# Patient Record
Sex: MALE | Race: WHITE | Employment: OTHER | URBAN - NONMETROPOLITAN AREA
[De-identification: names, ages, dates, MRNs, and addresses within clinical notes are randomized per-mention and may not be internally consistent; named-entity substitution may affect disease eponyms.]

---

## 2018-01-01 ENCOUNTER — APPOINTMENT (OUTPATIENT)
Dept: GENERAL RADIOLOGY | Age: 83
DRG: 207 | End: 2018-01-01
Attending: FAMILY MEDICINE
Payer: MEDICARE

## 2018-01-01 ENCOUNTER — HOSPITAL ENCOUNTER (INPATIENT)
Age: 83
LOS: 13 days | Discharge: HOSPICE | DRG: 207 | End: 2018-09-25
Attending: FAMILY MEDICINE | Admitting: FAMILY MEDICINE
Payer: MEDICARE

## 2018-01-01 ENCOUNTER — HOSPITAL ENCOUNTER (OUTPATIENT)
Age: 83
End: 2018-09-26
Attending: FAMILY MEDICINE | Admitting: FAMILY MEDICINE
Payer: MEDICARE

## 2018-01-01 ENCOUNTER — APPOINTMENT (OUTPATIENT)
Dept: GENERAL RADIOLOGY | Age: 83
DRG: 885 | End: 2018-01-01
Attending: PSYCHIATRY & NEUROLOGY
Payer: MEDICARE

## 2018-01-01 ENCOUNTER — APPOINTMENT (OUTPATIENT)
Dept: ULTRASOUND IMAGING | Age: 83
DRG: 207 | End: 2018-01-01
Attending: FAMILY MEDICINE
Payer: MEDICARE

## 2018-01-01 ENCOUNTER — HOSPITAL ENCOUNTER (INPATIENT)
Age: 83
LOS: 1 days | Discharge: ANOTHER HOSPITAL | DRG: 885 | End: 2018-09-11
Attending: PSYCHIATRY & NEUROLOGY | Admitting: PSYCHIATRY & NEUROLOGY
Payer: MEDICARE

## 2018-01-01 VITALS
BODY MASS INDEX: 21.79 KG/M2 | OXYGEN SATURATION: 97 % | TEMPERATURE: 99.7 F | RESPIRATION RATE: 16 BRPM | WEIGHT: 130.8 LBS | HEIGHT: 65 IN | DIASTOLIC BLOOD PRESSURE: 63 MMHG | SYSTOLIC BLOOD PRESSURE: 138 MMHG | HEART RATE: 68 BPM

## 2018-01-01 VITALS
DIASTOLIC BLOOD PRESSURE: 41 MMHG | WEIGHT: 145.6 LBS | HEART RATE: 104 BPM | HEIGHT: 66 IN | TEMPERATURE: 98.5 F | RESPIRATION RATE: 25 BRPM | SYSTOLIC BLOOD PRESSURE: 95 MMHG | BODY MASS INDEX: 23.4 KG/M2 | OXYGEN SATURATION: 97 %

## 2018-01-01 VITALS — RESPIRATION RATE: 26 BRPM

## 2018-01-01 LAB
ALBUMIN SERPL-MCNC: 1.5 G/DL (ref 3.5–5.2)
ALBUMIN SERPL-MCNC: 1.8 G/DL (ref 3.5–5.2)
ALBUMIN SERPL-MCNC: 2.1 G/DL (ref 3.5–5.2)
ALBUMIN SERPL-MCNC: 2.3 G/DL (ref 3.5–5.2)
ALBUMIN SERPL-MCNC: 2.5 G/DL (ref 3.5–5.2)
ALBUMIN SERPL-MCNC: 2.5 G/DL (ref 3.5–5.2)
ALBUMIN SERPL-MCNC: 2.9 G/DL (ref 3.5–5.2)
ALBUMIN SERPL-MCNC: 4.5 G/DL (ref 3.5–5.2)
ALP BLD-CCNC: 100 U/L (ref 40–130)
ALP BLD-CCNC: 131 U/L (ref 40–130)
ALP BLD-CCNC: 279 U/L (ref 40–130)
ALP BLD-CCNC: 43 U/L (ref 40–130)
ALP BLD-CCNC: 45 U/L (ref 40–130)
ALP BLD-CCNC: 59 U/L (ref 40–130)
ALP BLD-CCNC: 68 U/L (ref 40–130)
ALP BLD-CCNC: 72 U/L (ref 40–130)
ALT SERPL-CCNC: 19 U/L (ref 5–41)
ALT SERPL-CCNC: 25 U/L (ref 5–41)
ALT SERPL-CCNC: 27 U/L (ref 5–41)
ALT SERPL-CCNC: 27 U/L (ref 5–41)
ALT SERPL-CCNC: 28 U/L (ref 5–41)
ALT SERPL-CCNC: 29 U/L (ref 5–41)
ALT SERPL-CCNC: 35 U/L (ref 5–41)
ALT SERPL-CCNC: 92 U/L (ref 5–41)
AMORPHOUS: ABNORMAL /HPF
ANION GAP SERPL CALCULATED.3IONS-SCNC: 10 MMOL/L (ref 7–19)
ANION GAP SERPL CALCULATED.3IONS-SCNC: 11 MMOL/L (ref 7–19)
ANION GAP SERPL CALCULATED.3IONS-SCNC: 12 MMOL/L (ref 7–19)
ANION GAP SERPL CALCULATED.3IONS-SCNC: 14 MMOL/L (ref 7–19)
ANION GAP SERPL CALCULATED.3IONS-SCNC: 15 MMOL/L (ref 7–19)
ANION GAP SERPL CALCULATED.3IONS-SCNC: 15 MMOL/L (ref 7–19)
ANION GAP SERPL CALCULATED.3IONS-SCNC: 16 MMOL/L (ref 7–19)
ANION GAP SERPL CALCULATED.3IONS-SCNC: 16 MMOL/L (ref 7–19)
ANION GAP SERPL CALCULATED.3IONS-SCNC: 17 MMOL/L (ref 7–19)
ANION GAP SERPL CALCULATED.3IONS-SCNC: 22 MMOL/L (ref 7–19)
ANION GAP SERPL CALCULATED.3IONS-SCNC: 5 MMOL/L (ref 7–19)
ANION GAP SERPL CALCULATED.3IONS-SCNC: 7 MMOL/L (ref 7–19)
ANION GAP SERPL CALCULATED.3IONS-SCNC: 8 MMOL/L (ref 7–19)
ANION GAP SERPL CALCULATED.3IONS-SCNC: 8 MMOL/L (ref 7–19)
ANION GAP SERPL CALCULATED.3IONS-SCNC: 9 MMOL/L (ref 7–19)
AST SERPL-CCNC: 256 U/L (ref 5–40)
AST SERPL-CCNC: 27 U/L (ref 5–40)
AST SERPL-CCNC: 29 U/L (ref 5–40)
AST SERPL-CCNC: 30 U/L (ref 5–40)
AST SERPL-CCNC: 32 U/L (ref 5–40)
AST SERPL-CCNC: 32 U/L (ref 5–40)
AST SERPL-CCNC: 38 U/L (ref 5–40)
AST SERPL-CCNC: 57 U/L (ref 5–40)
ATYPICAL LYMPHOCYTE RELATIVE PERCENT: 0 % (ref 0–8)
ATYPICAL LYMPHOCYTE RELATIVE PERCENT: 1 % (ref 0–8)
ATYPICAL LYMPHOCYTE RELATIVE PERCENT: 3 % (ref 0–8)
BANDED NEUTROPHILS RELATIVE PERCENT: 1 % (ref 0–5)
BANDED NEUTROPHILS RELATIVE PERCENT: 11 % (ref 0–5)
BANDED NEUTROPHILS RELATIVE PERCENT: 11 % (ref 0–5)
BANDED NEUTROPHILS RELATIVE PERCENT: 18 % (ref 0–5)
BANDED NEUTROPHILS RELATIVE PERCENT: 5 % (ref 0–5)
BANDED NEUTROPHILS RELATIVE PERCENT: 5 % (ref 0–5)
BANDED NEUTROPHILS RELATIVE PERCENT: 8 % (ref 0–5)
BANDED NEUTROPHILS RELATIVE PERCENT: 8 % (ref 0–5)
BASE EXCESS ARTERIAL: -3.5 MMOL/L (ref -2–2)
BASE EXCESS ARTERIAL: 0.7 MMOL/L (ref -2–2)
BASE EXCESS ARTERIAL: 1.1 MMOL/L (ref -2–2)
BASE EXCESS ARTERIAL: 1.1 MMOL/L (ref -2–2)
BASE EXCESS ARTERIAL: 1.4 MMOL/L (ref -2–2)
BASE EXCESS ARTERIAL: 2.9 MMOL/L (ref -2–2)
BASE EXCESS ARTERIAL: 3 MMOL/L (ref -2–2)
BASE EXCESS ARTERIAL: 3.8 MMOL/L (ref -2–2)
BASE EXCESS ARTERIAL: 4.3 MMOL/L (ref -2–2)
BASE EXCESS ARTERIAL: 4.9 MMOL/L (ref -2–2)
BASE EXCESS ARTERIAL: 7.1 MMOL/L (ref -2–2)
BASE EXCESS ARTERIAL: 9.1 MMOL/L (ref -2–2)
BASOPHILS ABSOLUTE: 0 K/UL (ref 0–0.2)
BASOPHILS ABSOLUTE: 0.1 K/UL (ref 0–0.2)
BASOPHILS MANUAL: 0 %
BASOPHILS RELATIVE PERCENT: 0 % (ref 0–1)
BASOPHILS RELATIVE PERCENT: 0.1 % (ref 0–1)
BASOPHILS RELATIVE PERCENT: 0.2 % (ref 0–1)
BASOPHILS RELATIVE PERCENT: 0.3 % (ref 0–1)
BASOPHILS RELATIVE PERCENT: 1 % (ref 0–1)
BILIRUB SERPL-MCNC: 0.7 MG/DL (ref 0.2–1.2)
BILIRUB SERPL-MCNC: 0.8 MG/DL (ref 0.2–1.2)
BILIRUB SERPL-MCNC: 1.4 MG/DL (ref 0.2–1.2)
BILIRUB SERPL-MCNC: 1.5 MG/DL (ref 0.2–1.2)
BILIRUB SERPL-MCNC: 1.6 MG/DL (ref 0.2–1.2)
BILIRUB SERPL-MCNC: 1.9 MG/DL (ref 0.2–1.2)
BILIRUB SERPL-MCNC: 1.9 MG/DL (ref 0.2–1.2)
BILIRUB SERPL-MCNC: 2.1 MG/DL (ref 0.2–1.2)
BILIRUBIN URINE: ABNORMAL
BLOOD CULTURE, ROUTINE: NORMAL
BLOOD, URINE: ABNORMAL
BUN BLDV-MCNC: 19 MG/DL (ref 8–23)
BUN BLDV-MCNC: 21 MG/DL (ref 8–23)
BUN BLDV-MCNC: 30 MG/DL (ref 8–23)
BUN BLDV-MCNC: 52 MG/DL (ref 8–23)
BUN BLDV-MCNC: 75 MG/DL (ref 8–23)
BUN BLDV-MCNC: 75 MG/DL (ref 8–23)
BUN BLDV-MCNC: 76 MG/DL (ref 8–23)
BUN BLDV-MCNC: 78 MG/DL (ref 8–23)
BUN BLDV-MCNC: 80 MG/DL (ref 8–23)
BUN BLDV-MCNC: 82 MG/DL (ref 8–23)
BUN BLDV-MCNC: 84 MG/DL (ref 8–23)
BUN BLDV-MCNC: 84 MG/DL (ref 8–23)
BUN BLDV-MCNC: 85 MG/DL (ref 8–23)
BUN BLDV-MCNC: 87 MG/DL (ref 8–23)
BUN BLDV-MCNC: 90 MG/DL (ref 8–23)
BUN BLDV-MCNC: 96 MG/DL (ref 8–23)
BUN BLDV-MCNC: 99 MG/DL (ref 8–23)
C DIFFICILE TOXIN, EIA: NORMAL
CALCIUM SERPL-MCNC: 7.6 MG/DL (ref 8.8–10.2)
CALCIUM SERPL-MCNC: 7.7 MG/DL (ref 8.8–10.2)
CALCIUM SERPL-MCNC: 7.8 MG/DL (ref 8.8–10.2)
CALCIUM SERPL-MCNC: 8 MG/DL (ref 8.8–10.2)
CALCIUM SERPL-MCNC: 8.1 MG/DL (ref 8.8–10.2)
CALCIUM SERPL-MCNC: 8.3 MG/DL (ref 8.8–10.2)
CALCIUM SERPL-MCNC: 8.4 MG/DL (ref 8.8–10.2)
CALCIUM SERPL-MCNC: 8.6 MG/DL (ref 8.8–10.2)
CALCIUM SERPL-MCNC: 8.6 MG/DL (ref 8.8–10.2)
CALCIUM SERPL-MCNC: 8.7 MG/DL (ref 8.8–10.2)
CALCIUM SERPL-MCNC: 8.8 MG/DL (ref 8.8–10.2)
CALCIUM SERPL-MCNC: 8.8 MG/DL (ref 8.8–10.2)
CALCIUM SERPL-MCNC: 9 MG/DL (ref 8.8–10.2)
CALCIUM SERPL-MCNC: 9.1 MG/DL (ref 8.8–10.2)
CALCIUM SERPL-MCNC: 9.8 MG/DL (ref 8.8–10.2)
CARBOXYHEMOGLOBIN ARTERIAL: 1.8 % (ref 0–5)
CARBOXYHEMOGLOBIN ARTERIAL: 1.9 % (ref 0–5)
CARBOXYHEMOGLOBIN ARTERIAL: 1.9 % (ref 0–5)
CARBOXYHEMOGLOBIN ARTERIAL: 2.1 % (ref 0–5)
CARBOXYHEMOGLOBIN ARTERIAL: 2.1 % (ref 0–5)
CARBOXYHEMOGLOBIN ARTERIAL: 2.2 % (ref 0–5)
CARBOXYHEMOGLOBIN ARTERIAL: 2.2 % (ref 0–5)
CARBOXYHEMOGLOBIN ARTERIAL: 2.4 % (ref 0–5)
CARBOXYHEMOGLOBIN ARTERIAL: 2.7 % (ref 0–5)
CARBOXYHEMOGLOBIN ARTERIAL: 2.7 % (ref 0–5)
CHLORIDE BLD-SCNC: 101 MMOL/L (ref 98–111)
CHLORIDE BLD-SCNC: 102 MMOL/L (ref 98–111)
CHLORIDE BLD-SCNC: 103 MMOL/L (ref 98–111)
CHLORIDE BLD-SCNC: 104 MMOL/L (ref 98–111)
CHLORIDE BLD-SCNC: 106 MMOL/L (ref 98–111)
CHLORIDE BLD-SCNC: 109 MMOL/L (ref 98–111)
CHLORIDE BLD-SCNC: 109 MMOL/L (ref 98–111)
CHLORIDE BLD-SCNC: 112 MMOL/L (ref 98–111)
CHLORIDE BLD-SCNC: 97 MMOL/L (ref 98–111)
CHLORIDE BLD-SCNC: 98 MMOL/L (ref 98–111)
CLARITY: ABNORMAL
CO2: 21 MMOL/L (ref 22–29)
CO2: 22 MMOL/L (ref 22–29)
CO2: 23 MMOL/L (ref 22–29)
CO2: 23 MMOL/L (ref 22–29)
CO2: 24 MMOL/L (ref 22–29)
CO2: 24 MMOL/L (ref 22–29)
CO2: 25 MMOL/L (ref 22–29)
CO2: 26 MMOL/L (ref 22–29)
CO2: 27 MMOL/L (ref 22–29)
CO2: 27 MMOL/L (ref 22–29)
CO2: 28 MMOL/L (ref 22–29)
CO2: 28 MMOL/L (ref 22–29)
CO2: 29 MMOL/L (ref 22–29)
CO2: 29 MMOL/L (ref 22–29)
CO2: 33 MMOL/L (ref 22–29)
COLOR: YELLOW
CREAT SERPL-MCNC: 0.7 MG/DL (ref 0.5–1.2)
CREAT SERPL-MCNC: 0.8 MG/DL (ref 0.5–1.2)
CREAT SERPL-MCNC: 1 MG/DL (ref 0.5–1.2)
CREAT SERPL-MCNC: 1.1 MG/DL (ref 0.5–1.2)
CREAT SERPL-MCNC: 1.2 MG/DL (ref 0.5–1.2)
CREAT SERPL-MCNC: 1.3 MG/DL (ref 0.5–1.2)
CREAT SERPL-MCNC: 1.3 MG/DL (ref 0.5–1.2)
CREAT SERPL-MCNC: 1.4 MG/DL (ref 0.5–1.2)
CREAT SERPL-MCNC: 1.4 MG/DL (ref 0.5–1.2)
CREAT SERPL-MCNC: 1.5 MG/DL (ref 0.5–1.2)
CREAT SERPL-MCNC: 1.7 MG/DL (ref 0.5–1.2)
CREAT SERPL-MCNC: 1.7 MG/DL (ref 0.5–1.2)
CREAT SERPL-MCNC: 2 MG/DL (ref 0.5–1.2)
CREAT SERPL-MCNC: 2 MG/DL (ref 0.5–1.2)
CREAT SERPL-MCNC: 3.1 MG/DL (ref 0.5–1.2)
CREATININE URINE: 146.3 MG/DL (ref 4.2–622)
CULTURE, BLOOD 2: NORMAL
CULTURE, RESPIRATORY: ABNORMAL
EKG P AXIS: 78 DEGREES
EKG P AXIS: 78 DEGREES
EKG P AXIS: 95 DEGREES
EKG P AXIS: NORMAL DEGREES
EKG P AXIS: NORMAL DEGREES
EKG P-R INTERVAL: 150 MS
EKG P-R INTERVAL: 162 MS
EKG P-R INTERVAL: 162 MS
EKG P-R INTERVAL: NORMAL MS
EKG P-R INTERVAL: NORMAL MS
EKG Q-T INTERVAL: 294 MS
EKG Q-T INTERVAL: 330 MS
EKG Q-T INTERVAL: 410 MS
EKG Q-T INTERVAL: 418 MS
EKG Q-T INTERVAL: 446 MS
EKG QRS DURATION: 100 MS
EKG QRS DURATION: 106 MS
EKG QRS DURATION: 88 MS
EKG QRS DURATION: 94 MS
EKG QRS DURATION: 98 MS
EKG QTC CALCULATION (BAZETT): 432 MS
EKG QTC CALCULATION (BAZETT): 436 MS
EKG QTC CALCULATION (BAZETT): 447 MS
EKG QTC CALCULATION (BAZETT): 457 MS
EKG QTC CALCULATION (BAZETT): 460 MS
EKG T AXIS: -66 DEGREES
EKG T AXIS: -85 DEGREES
EKG T AXIS: 25 DEGREES
EKG T AXIS: 3 DEGREES
EKG T AXIS: 53 DEGREES
EOSINOPHIL,URINE: NORMAL
EOSINOPHILS ABSOLUTE: 0 K/UL (ref 0–0.6)
EOSINOPHILS ABSOLUTE: 0.2 K/UL (ref 0–0.6)
EOSINOPHILS ABSOLUTE: 0.3 K/UL (ref 0–0.6)
EOSINOPHILS ABSOLUTE: 0.6 K/UL (ref 0–0.6)
EOSINOPHILS ABSOLUTE: 0.8 K/UL (ref 0–0.6)
EOSINOPHILS RELATIVE PERCENT: 0 % (ref 0–5)
EOSINOPHILS RELATIVE PERCENT: 0.1 % (ref 0–5)
EOSINOPHILS RELATIVE PERCENT: 0.6 % (ref 0–5)
EOSINOPHILS RELATIVE PERCENT: 0.9 % (ref 0–5)
EOSINOPHILS RELATIVE PERCENT: 2 % (ref 0–5)
EOSINOPHILS RELATIVE PERCENT: 5 % (ref 0–5)
EPITHELIAL CELLS, UA: ABNORMAL /HPF
GFR NON-AFRICAN AMERICAN: 19
GFR NON-AFRICAN AMERICAN: 32
GFR NON-AFRICAN AMERICAN: 32
GFR NON-AFRICAN AMERICAN: 38
GFR NON-AFRICAN AMERICAN: 38
GFR NON-AFRICAN AMERICAN: 44
GFR NON-AFRICAN AMERICAN: 48
GFR NON-AFRICAN AMERICAN: 48
GFR NON-AFRICAN AMERICAN: 52
GFR NON-AFRICAN AMERICAN: 52
GFR NON-AFRICAN AMERICAN: 57
GFR NON-AFRICAN AMERICAN: >60
GLUCOSE BLD-MCNC: 104 MG/DL (ref 70–99)
GLUCOSE BLD-MCNC: 112 MG/DL (ref 70–99)
GLUCOSE BLD-MCNC: 112 MG/DL (ref 74–109)
GLUCOSE BLD-MCNC: 116 MG/DL (ref 74–109)
GLUCOSE BLD-MCNC: 119 MG/DL (ref 74–109)
GLUCOSE BLD-MCNC: 121 MG/DL (ref 70–99)
GLUCOSE BLD-MCNC: 124 MG/DL (ref 70–99)
GLUCOSE BLD-MCNC: 126 MG/DL (ref 70–99)
GLUCOSE BLD-MCNC: 128 MG/DL (ref 74–109)
GLUCOSE BLD-MCNC: 129 MG/DL (ref 70–99)
GLUCOSE BLD-MCNC: 130 MG/DL (ref 70–99)
GLUCOSE BLD-MCNC: 132 MG/DL (ref 70–99)
GLUCOSE BLD-MCNC: 135 MG/DL (ref 70–99)
GLUCOSE BLD-MCNC: 139 MG/DL (ref 70–99)
GLUCOSE BLD-MCNC: 146 MG/DL (ref 70–99)
GLUCOSE BLD-MCNC: 147 MG/DL (ref 70–99)
GLUCOSE BLD-MCNC: 147 MG/DL (ref 74–109)
GLUCOSE BLD-MCNC: 149 MG/DL (ref 70–99)
GLUCOSE BLD-MCNC: 150 MG/DL (ref 70–99)
GLUCOSE BLD-MCNC: 154 MG/DL (ref 70–99)
GLUCOSE BLD-MCNC: 156 MG/DL (ref 70–99)
GLUCOSE BLD-MCNC: 156 MG/DL (ref 70–99)
GLUCOSE BLD-MCNC: 158 MG/DL (ref 70–99)
GLUCOSE BLD-MCNC: 160 MG/DL (ref 70–99)
GLUCOSE BLD-MCNC: 161 MG/DL (ref 70–99)
GLUCOSE BLD-MCNC: 161 MG/DL (ref 74–109)
GLUCOSE BLD-MCNC: 161 MG/DL (ref 74–109)
GLUCOSE BLD-MCNC: 164 MG/DL (ref 70–99)
GLUCOSE BLD-MCNC: 165 MG/DL (ref 70–99)
GLUCOSE BLD-MCNC: 166 MG/DL (ref 70–99)
GLUCOSE BLD-MCNC: 167 MG/DL (ref 70–99)
GLUCOSE BLD-MCNC: 168 MG/DL (ref 70–99)
GLUCOSE BLD-MCNC: 168 MG/DL (ref 74–109)
GLUCOSE BLD-MCNC: 169 MG/DL (ref 74–109)
GLUCOSE BLD-MCNC: 171 MG/DL (ref 70–99)
GLUCOSE BLD-MCNC: 176 MG/DL (ref 70–99)
GLUCOSE BLD-MCNC: 176 MG/DL (ref 74–109)
GLUCOSE BLD-MCNC: 181 MG/DL (ref 74–109)
GLUCOSE BLD-MCNC: 184 MG/DL (ref 70–99)
GLUCOSE BLD-MCNC: 187 MG/DL (ref 70–99)
GLUCOSE BLD-MCNC: 188 MG/DL (ref 70–99)
GLUCOSE BLD-MCNC: 189 MG/DL (ref 74–109)
GLUCOSE BLD-MCNC: 190 MG/DL (ref 70–99)
GLUCOSE BLD-MCNC: 191 MG/DL (ref 70–99)
GLUCOSE BLD-MCNC: 200 MG/DL (ref 70–99)
GLUCOSE BLD-MCNC: 201 MG/DL (ref 70–99)
GLUCOSE BLD-MCNC: 207 MG/DL (ref 70–99)
GLUCOSE BLD-MCNC: 209 MG/DL (ref 70–99)
GLUCOSE BLD-MCNC: 209 MG/DL (ref 70–99)
GLUCOSE BLD-MCNC: 212 MG/DL (ref 70–99)
GLUCOSE BLD-MCNC: 217 MG/DL (ref 70–99)
GLUCOSE BLD-MCNC: 220 MG/DL (ref 70–99)
GLUCOSE BLD-MCNC: 222 MG/DL (ref 70–99)
GLUCOSE BLD-MCNC: 234 MG/DL (ref 74–109)
GLUCOSE BLD-MCNC: 239 MG/DL (ref 70–99)
GLUCOSE BLD-MCNC: 241 MG/DL (ref 70–99)
GLUCOSE BLD-MCNC: 244 MG/DL (ref 70–99)
GLUCOSE BLD-MCNC: 255 MG/DL (ref 70–99)
GLUCOSE BLD-MCNC: 257 MG/DL (ref 70–99)
GLUCOSE BLD-MCNC: 262 MG/DL (ref 70–99)
GLUCOSE BLD-MCNC: 263 MG/DL (ref 70–99)
GLUCOSE BLD-MCNC: 266 MG/DL (ref 74–109)
GLUCOSE BLD-MCNC: 275 MG/DL (ref 70–99)
GLUCOSE BLD-MCNC: 278 MG/DL (ref 70–99)
GLUCOSE BLD-MCNC: 294 MG/DL (ref 70–99)
GLUCOSE BLD-MCNC: 296 MG/DL (ref 70–99)
GLUCOSE BLD-MCNC: 304 MG/DL (ref 70–99)
GLUCOSE BLD-MCNC: 304 MG/DL (ref 74–109)
GLUCOSE BLD-MCNC: 315 MG/DL (ref 70–99)
GLUCOSE BLD-MCNC: 318 MG/DL (ref 74–109)
GLUCOSE BLD-MCNC: 322 MG/DL (ref 74–109)
GLUCOSE BLD-MCNC: 328 MG/DL (ref 70–99)
GLUCOSE BLD-MCNC: 424 MG/DL (ref 70–99)
GLUCOSE BLD-MCNC: 87 MG/DL (ref 70–99)
GLUCOSE BLD-MCNC: 94 MG/DL (ref 70–99)
GLUCOSE URINE: NEGATIVE MG/DL
GRAM STAIN RESULT: ABNORMAL
HBA1C MFR BLD: 6.1 % (ref 4–6)
HCO3 ARTERIAL: 20.4 MMOL/L (ref 22–26)
HCO3 ARTERIAL: 23.6 MMOL/L (ref 22–26)
HCO3 ARTERIAL: 24.5 MMOL/L (ref 22–26)
HCO3 ARTERIAL: 25 MMOL/L (ref 22–26)
HCO3 ARTERIAL: 25.1 MMOL/L (ref 22–26)
HCO3 ARTERIAL: 25.7 MMOL/L (ref 22–26)
HCO3 ARTERIAL: 26.3 MMOL/L (ref 22–26)
HCO3 ARTERIAL: 27.9 MMOL/L (ref 22–26)
HCO3 ARTERIAL: 29.2 MMOL/L (ref 22–26)
HCO3 ARTERIAL: 29.9 MMOL/L (ref 22–26)
HCO3 ARTERIAL: 31.3 MMOL/L (ref 22–26)
HCO3 ARTERIAL: 32.7 MMOL/L (ref 22–26)
HCT VFR BLD CALC: 29.2 % (ref 42–52)
HCT VFR BLD CALC: 29.8 % (ref 42–52)
HCT VFR BLD CALC: 32.6 % (ref 42–52)
HCT VFR BLD CALC: 34.3 % (ref 42–52)
HCT VFR BLD CALC: 37.8 % (ref 42–52)
HCT VFR BLD CALC: 38 % (ref 42–52)
HCT VFR BLD CALC: 43.9 % (ref 42–52)
HCT VFR BLD CALC: 44.2 % (ref 42–52)
HCT VFR BLD CALC: 44.6 % (ref 42–52)
HCT VFR BLD CALC: 46.2 % (ref 42–52)
HCT VFR BLD CALC: 46.4 % (ref 42–52)
HCT VFR BLD CALC: 47.5 % (ref 42–52)
HCT VFR BLD CALC: 48.5 % (ref 42–52)
HCT VFR BLD CALC: 49.4 % (ref 42–52)
HCT VFR BLD CALC: 51.2 % (ref 42–52)
HEMOGLOBIN, ART, EXTENDED: 10.5 G/DL (ref 14–18)
HEMOGLOBIN, ART, EXTENDED: 10.8 G/DL (ref 14–18)
HEMOGLOBIN, ART, EXTENDED: 11.5 G/DL (ref 14–18)
HEMOGLOBIN, ART, EXTENDED: 12.4 G/DL (ref 14–18)
HEMOGLOBIN, ART, EXTENDED: 13.1 G/DL (ref 14–18)
HEMOGLOBIN, ART, EXTENDED: 13.4 G/DL (ref 14–18)
HEMOGLOBIN, ART, EXTENDED: 13.5 G/DL (ref 14–18)
HEMOGLOBIN, ART, EXTENDED: 14.1 G/DL (ref 14–18)
HEMOGLOBIN, ART, EXTENDED: 15 G/DL (ref 14–18)
HEMOGLOBIN, ART, EXTENDED: 15.3 G/DL (ref 14–18)
HEMOGLOBIN, ART, EXTENDED: 16.3 G/DL (ref 14–18)
HEMOGLOBIN, ART, EXTENDED: 16.7 G/DL (ref 14–18)
HEMOGLOBIN: 10 G/DL (ref 14–18)
HEMOGLOBIN: 10.9 G/DL (ref 14–18)
HEMOGLOBIN: 11.5 G/DL (ref 14–18)
HEMOGLOBIN: 12.5 G/DL (ref 14–18)
HEMOGLOBIN: 12.7 G/DL (ref 14–18)
HEMOGLOBIN: 14.5 G/DL (ref 14–18)
HEMOGLOBIN: 14.7 G/DL (ref 14–18)
HEMOGLOBIN: 15.1 G/DL (ref 14–18)
HEMOGLOBIN: 15.5 G/DL (ref 14–18)
HEMOGLOBIN: 15.8 G/DL (ref 14–18)
HEMOGLOBIN: 15.9 G/DL (ref 14–18)
HEMOGLOBIN: 16.1 G/DL (ref 14–18)
HEMOGLOBIN: 16.2 G/DL (ref 14–18)
HEMOGLOBIN: 16.9 G/DL (ref 14–18)
HEMOGLOBIN: 9.6 G/DL (ref 14–18)
HYPOCHROMIA: ABNORMAL
KETONES, URINE: ABNORMAL MG/DL
LACTIC ACID: 1.6 MMOL/L (ref 0.5–1.9)
LEUKOCYTE ESTERASE, URINE: NEGATIVE
LV EF: 50 %
LVEF MODALITY: NORMAL
LYMPHOCYTES ABSOLUTE: 0.8 K/UL (ref 1.1–4.5)
LYMPHOCYTES ABSOLUTE: 1 K/UL (ref 1.1–4.5)
LYMPHOCYTES ABSOLUTE: 1.4 K/UL (ref 1.1–4.5)
LYMPHOCYTES ABSOLUTE: 1.4 K/UL (ref 1.1–4.5)
LYMPHOCYTES ABSOLUTE: 1.5 K/UL (ref 1.1–4.5)
LYMPHOCYTES ABSOLUTE: 1.6 K/UL (ref 1.1–4.5)
LYMPHOCYTES ABSOLUTE: 1.6 K/UL (ref 1.1–4.5)
LYMPHOCYTES ABSOLUTE: 1.9 K/UL (ref 1.1–4.5)
LYMPHOCYTES ABSOLUTE: 2 K/UL (ref 1.1–4.5)
LYMPHOCYTES ABSOLUTE: 2 K/UL (ref 1.1–4.5)
LYMPHOCYTES ABSOLUTE: 2.1 K/UL (ref 1.1–4.5)
LYMPHOCYTES ABSOLUTE: 2.8 K/UL (ref 1.1–4.5)
LYMPHOCYTES ABSOLUTE: 2.8 K/UL (ref 1.1–4.5)
LYMPHOCYTES RELATIVE PERCENT: 14 % (ref 20–40)
LYMPHOCYTES RELATIVE PERCENT: 15 % (ref 20–40)
LYMPHOCYTES RELATIVE PERCENT: 17 % (ref 20–40)
LYMPHOCYTES RELATIVE PERCENT: 17 % (ref 20–40)
LYMPHOCYTES RELATIVE PERCENT: 23.5 % (ref 20–40)
LYMPHOCYTES RELATIVE PERCENT: 3.5 % (ref 20–40)
LYMPHOCYTES RELATIVE PERCENT: 3.9 % (ref 20–40)
LYMPHOCYTES RELATIVE PERCENT: 4 % (ref 20–40)
LYMPHOCYTES RELATIVE PERCENT: 4.4 % (ref 20–40)
LYMPHOCYTES RELATIVE PERCENT: 5 % (ref 20–40)
LYMPHOCYTES RELATIVE PERCENT: 5.1 % (ref 20–40)
LYMPHOCYTES RELATIVE PERCENT: 7 % (ref 20–40)
LYMPHOCYTES RELATIVE PERCENT: 7 % (ref 20–40)
LYMPHOCYTES RELATIVE PERCENT: 8.7 % (ref 20–40)
LYMPHOCYTES RELATIVE PERCENT: 9 % (ref 20–40)
MACROCYTES: ABNORMAL
MAGNESIUM: 2.4 MG/DL (ref 1.6–2.4)
MAGNESIUM: 2.7 MG/DL (ref 1.6–2.4)
MCH RBC QN AUTO: 32.6 PG (ref 27–31)
MCH RBC QN AUTO: 32.9 PG (ref 27–31)
MCH RBC QN AUTO: 32.9 PG (ref 27–31)
MCH RBC QN AUTO: 33 PG (ref 27–31)
MCH RBC QN AUTO: 33.1 PG (ref 27–31)
MCH RBC QN AUTO: 33.1 PG (ref 27–31)
MCH RBC QN AUTO: 33.2 PG (ref 27–31)
MCH RBC QN AUTO: 33.3 PG (ref 27–31)
MCH RBC QN AUTO: 33.4 PG (ref 27–31)
MCH RBC QN AUTO: 33.5 PG (ref 27–31)
MCHC RBC AUTO-ENTMCNC: 32.5 G/DL (ref 33–37)
MCHC RBC AUTO-ENTMCNC: 32.6 G/DL (ref 33–37)
MCHC RBC AUTO-ENTMCNC: 32.9 G/DL (ref 33–37)
MCHC RBC AUTO-ENTMCNC: 33 G/DL (ref 33–37)
MCHC RBC AUTO-ENTMCNC: 33.1 G/DL (ref 33–37)
MCHC RBC AUTO-ENTMCNC: 33.3 G/DL (ref 33–37)
MCHC RBC AUTO-ENTMCNC: 33.4 G/DL (ref 33–37)
MCHC RBC AUTO-ENTMCNC: 33.5 G/DL (ref 33–37)
MCHC RBC AUTO-ENTMCNC: 33.5 G/DL (ref 33–37)
MCHC RBC AUTO-ENTMCNC: 33.6 G/DL (ref 33–37)
MCHC RBC AUTO-ENTMCNC: 34.2 G/DL (ref 33–37)
MCHC RBC AUTO-ENTMCNC: 34.4 G/DL (ref 33–37)
MCV RBC AUTO: 100 FL (ref 80–94)
MCV RBC AUTO: 100 FL (ref 80–94)
MCV RBC AUTO: 100.3 FL (ref 80–94)
MCV RBC AUTO: 101.1 FL (ref 80–94)
MCV RBC AUTO: 101.8 FL (ref 80–94)
MCV RBC AUTO: 96.9 FL (ref 80–94)
MCV RBC AUTO: 97.3 FL (ref 80–94)
MCV RBC AUTO: 98.5 FL (ref 80–94)
MCV RBC AUTO: 98.8 FL (ref 80–94)
MCV RBC AUTO: 99.3 FL (ref 80–94)
MCV RBC AUTO: 99.6 FL (ref 80–94)
MCV RBC AUTO: 99.7 FL (ref 80–94)
MCV RBC AUTO: 99.8 FL (ref 80–94)
METHEMOGLOBIN ARTERIAL: 1.1 %
METHEMOGLOBIN ARTERIAL: 1.2 %
METHEMOGLOBIN ARTERIAL: 1.4 %
METHEMOGLOBIN ARTERIAL: 1.5 %
METHEMOGLOBIN ARTERIAL: 1.6 %
METHEMOGLOBIN ARTERIAL: 1.6 %
METHEMOGLOBIN ARTERIAL: 1.7 %
METHEMOGLOBIN ARTERIAL: 1.9 %
MONOCYTES ABSOLUTE: 0 K/UL (ref 0–0.9)
MONOCYTES ABSOLUTE: 0.6 K/UL (ref 0–0.9)
MONOCYTES ABSOLUTE: 0.8 K/UL (ref 0–0.9)
MONOCYTES ABSOLUTE: 0.9 K/UL (ref 0–0.9)
MONOCYTES ABSOLUTE: 1 K/UL (ref 0–0.9)
MONOCYTES ABSOLUTE: 1.1 K/UL (ref 0–0.9)
MONOCYTES ABSOLUTE: 1.2 K/UL (ref 0–0.9)
MONOCYTES ABSOLUTE: 1.3 K/UL (ref 0–0.9)
MONOCYTES ABSOLUTE: 1.4 K/UL (ref 0–0.9)
MONOCYTES ABSOLUTE: 1.5 K/UL (ref 0–0.9)
MONOCYTES ABSOLUTE: 1.6 K/UL (ref 0–0.9)
MONOCYTES ABSOLUTE: 1.8 K/UL (ref 0–0.9)
MONOCYTES ABSOLUTE: 1.8 K/UL (ref 0–0.9)
MONOCYTES ABSOLUTE: 2 K/UL (ref 0–0.9)
MONOCYTES ABSOLUTE: 2.1 K/UL (ref 0–0.9)
MONOCYTES RELATIVE PERCENT: 0 % (ref 0–10)
MONOCYTES RELATIVE PERCENT: 10 % (ref 0–10)
MONOCYTES RELATIVE PERCENT: 10.1 % (ref 0–10)
MONOCYTES RELATIVE PERCENT: 4 % (ref 0–10)
MONOCYTES RELATIVE PERCENT: 5 % (ref 0–10)
MONOCYTES RELATIVE PERCENT: 5.8 % (ref 0–10)
MONOCYTES RELATIVE PERCENT: 6.2 % (ref 0–10)
MONOCYTES RELATIVE PERCENT: 6.4 % (ref 0–10)
MONOCYTES RELATIVE PERCENT: 6.7 % (ref 0–10)
MONOCYTES RELATIVE PERCENT: 8 % (ref 0–10)
MONOCYTES RELATIVE PERCENT: 9 % (ref 0–10)
MONOCYTES RELATIVE PERCENT: 9 % (ref 0–10)
MYELOCYTE PERCENT: 1 %
NEUTROPHILS ABSOLUTE: 11.1 K/UL (ref 1.5–7.5)
NEUTROPHILS ABSOLUTE: 13 K/UL (ref 1.5–7.5)
NEUTROPHILS ABSOLUTE: 18.1 K/UL (ref 1.5–7.5)
NEUTROPHILS ABSOLUTE: 18.9 K/UL (ref 1.5–7.5)
NEUTROPHILS ABSOLUTE: 20.3 K/UL (ref 1.5–7.5)
NEUTROPHILS ABSOLUTE: 25.2 K/UL (ref 1.5–7.5)
NEUTROPHILS ABSOLUTE: 27.1 K/UL (ref 1.5–7.5)
NEUTROPHILS ABSOLUTE: 34.4 K/UL (ref 1.5–7.5)
NEUTROPHILS ABSOLUTE: 35.1 K/UL (ref 1.5–7.5)
NEUTROPHILS ABSOLUTE: 37.9 K/UL (ref 1.5–7.5)
NEUTROPHILS ABSOLUTE: 7.2 K/UL (ref 1.5–7.5)
NEUTROPHILS ABSOLUTE: 7.3 K/UL (ref 1.5–7.5)
NEUTROPHILS ABSOLUTE: 7.8 K/UL (ref 1.5–7.5)
NEUTROPHILS ABSOLUTE: 8 K/UL (ref 1.5–7.5)
NEUTROPHILS ABSOLUTE: 9 K/UL (ref 1.5–7.5)
NEUTROPHILS MANUAL: 64 %
NEUTROPHILS MANUAL: 64 %
NEUTROPHILS MANUAL: 69 %
NEUTROPHILS MANUAL: 74 %
NEUTROPHILS MANUAL: 78 %
NEUTROPHILS MANUAL: 78 %
NEUTROPHILS MANUAL: 80 %
NEUTROPHILS MANUAL: 81 %
NEUTROPHILS MANUAL: 90 %
NEUTROPHILS RELATIVE PERCENT: 60.1 % (ref 50–65)
NEUTROPHILS RELATIVE PERCENT: 64 % (ref 50–65)
NEUTROPHILS RELATIVE PERCENT: 64 % (ref 50–65)
NEUTROPHILS RELATIVE PERCENT: 69 % (ref 50–65)
NEUTROPHILS RELATIVE PERCENT: 74 % (ref 50–65)
NEUTROPHILS RELATIVE PERCENT: 78 % (ref 50–65)
NEUTROPHILS RELATIVE PERCENT: 78 % (ref 50–65)
NEUTROPHILS RELATIVE PERCENT: 80 % (ref 50–65)
NEUTROPHILS RELATIVE PERCENT: 81 % (ref 50–65)
NEUTROPHILS RELATIVE PERCENT: 81.6 % (ref 50–65)
NEUTROPHILS RELATIVE PERCENT: 85.9 % (ref 50–65)
NEUTROPHILS RELATIVE PERCENT: 86 % (ref 50–65)
NEUTROPHILS RELATIVE PERCENT: 87.3 % (ref 50–65)
NEUTROPHILS RELATIVE PERCENT: 87.4 % (ref 50–65)
NEUTROPHILS RELATIVE PERCENT: 90 % (ref 50–65)
NITRITE, URINE: NEGATIVE
O2 CONTENT ARTERIAL: 14.2 ML/DL
O2 CONTENT ARTERIAL: 14.3 ML/DL
O2 CONTENT ARTERIAL: 15.6 ML/DL
O2 CONTENT ARTERIAL: 16.4 ML/DL
O2 CONTENT ARTERIAL: 17.3 ML/DL
O2 CONTENT ARTERIAL: 17.7 ML/DL
O2 CONTENT ARTERIAL: 18.7 ML/DL
O2 CONTENT ARTERIAL: 19 ML/DL
O2 CONTENT ARTERIAL: 19.4 ML/DL
O2 CONTENT ARTERIAL: 19.7 ML/DL
O2 CONTENT ARTERIAL: 19.9 ML/DL
O2 CONTENT ARTERIAL: 22.2 ML/DL
O2 SAT, ARTERIAL: 86.3 %
O2 SAT, ARTERIAL: 90.1 %
O2 SAT, ARTERIAL: 90.4 %
O2 SAT, ARTERIAL: 93.7 %
O2 SAT, ARTERIAL: 93.7 %
O2 SAT, ARTERIAL: 93.9 %
O2 SAT, ARTERIAL: 94 %
O2 SAT, ARTERIAL: 94.8 %
O2 SAT, ARTERIAL: 95.5 %
O2 SAT, ARTERIAL: 95.8 %
O2 SAT, ARTERIAL: 97.1 %
O2 SAT, ARTERIAL: 97.2 %
O2 THERAPY: ABNORMAL
OCCULT BLOOD QC: ABNORMAL
OCCULT BLOOD SCREENING: ABNORMAL
ORGANISM: ABNORMAL
ORGANISM: ABNORMAL
OVALOCYTES: ABNORMAL
PARATHYROID HORMONE INTACT: 78.6 PG/ML (ref 15–65)
PCO2 ARTERIAL: 31 MMHG (ref 35–45)
PCO2 ARTERIAL: 33 MMHG (ref 35–45)
PCO2 ARTERIAL: 36 MMHG (ref 35–45)
PCO2 ARTERIAL: 36 MMHG (ref 35–45)
PCO2 ARTERIAL: 37 MMHG (ref 35–45)
PCO2 ARTERIAL: 40 MMHG (ref 35–45)
PCO2 ARTERIAL: 42 MMHG (ref 35–45)
PCO2 ARTERIAL: 43 MMHG (ref 35–45)
PCO2 ARTERIAL: 44 MMHG (ref 35–45)
PCO2 ARTERIAL: 45 MMHG (ref 35–45)
PDW BLD-RTO: 12.4 % (ref 11.5–14.5)
PDW BLD-RTO: 12.6 % (ref 11.5–14.5)
PDW BLD-RTO: 12.7 % (ref 11.5–14.5)
PDW BLD-RTO: 13 % (ref 11.5–14.5)
PDW BLD-RTO: 13.2 % (ref 11.5–14.5)
PDW BLD-RTO: 13.2 % (ref 11.5–14.5)
PDW BLD-RTO: 13.3 % (ref 11.5–14.5)
PDW BLD-RTO: 13.3 % (ref 11.5–14.5)
PDW BLD-RTO: 13.8 % (ref 11.5–14.5)
PDW BLD-RTO: 14.2 % (ref 11.5–14.5)
PDW BLD-RTO: 14.3 % (ref 11.5–14.5)
PDW BLD-RTO: 14.4 % (ref 11.5–14.5)
PDW BLD-RTO: 14.6 % (ref 11.5–14.5)
PERFORMED ON: ABNORMAL
PERFORMED ON: NORMAL
PERFORMED ON: NORMAL
PH ARTERIAL: 7.4 (ref 7.35–7.45)
PH ARTERIAL: 7.42 (ref 7.35–7.45)
PH ARTERIAL: 7.43 (ref 7.35–7.45)
PH ARTERIAL: 7.43 (ref 7.35–7.45)
PH ARTERIAL: 7.44 (ref 7.35–7.45)
PH ARTERIAL: 7.44 (ref 7.35–7.45)
PH ARTERIAL: 7.45 (ref 7.35–7.45)
PH ARTERIAL: 7.48 (ref 7.35–7.45)
PH ARTERIAL: 7.49 (ref 7.35–7.45)
PH ARTERIAL: 7.5 (ref 7.35–7.45)
PH ARTERIAL: 7.51 (ref 7.35–7.45)
PH ARTERIAL: 7.52 (ref 7.35–7.45)
PH UA: 5
PHOSPHORUS: 2.9 MG/DL (ref 2.5–4.5)
PLATELET # BLD: 129 K/UL (ref 130–400)
PLATELET # BLD: 135 K/UL (ref 130–400)
PLATELET # BLD: 150 K/UL (ref 130–400)
PLATELET # BLD: 161 K/UL (ref 130–400)
PLATELET # BLD: 164 K/UL (ref 130–400)
PLATELET # BLD: 170 K/UL (ref 130–400)
PLATELET # BLD: 175 K/UL (ref 130–400)
PLATELET # BLD: 184 K/UL (ref 130–400)
PLATELET # BLD: 186 K/UL (ref 130–400)
PLATELET # BLD: 186 K/UL (ref 130–400)
PLATELET # BLD: 198 K/UL (ref 130–400)
PLATELET # BLD: 219 K/UL (ref 130–400)
PLATELET # BLD: 221 K/UL (ref 130–400)
PLATELET # BLD: 253 K/UL (ref 130–400)
PLATELET # BLD: 353 K/UL (ref 130–400)
PLATELET SLIDE REVIEW: ABNORMAL
PLATELET SLIDE REVIEW: ABNORMAL
PLATELET SLIDE REVIEW: ADEQUATE
PMV BLD AUTO: 11.9 FL (ref 9.4–12.4)
PMV BLD AUTO: 12.1 FL (ref 9.4–12.4)
PMV BLD AUTO: 12.1 FL (ref 9.4–12.4)
PMV BLD AUTO: 12.2 FL (ref 9.4–12.4)
PMV BLD AUTO: 12.3 FL (ref 9.4–12.4)
PMV BLD AUTO: 12.4 FL (ref 9.4–12.4)
PMV BLD AUTO: 12.4 FL (ref 9.4–12.4)
PMV BLD AUTO: 12.5 FL (ref 9.4–12.4)
PMV BLD AUTO: 12.7 FL (ref 9.4–12.4)
PMV BLD AUTO: 12.7 FL (ref 9.4–12.4)
PMV BLD AUTO: 13.1 FL (ref 9.4–12.4)
PO2 ARTERIAL: 148 MMHG (ref 80–100)
PO2 ARTERIAL: 267 MMHG (ref 80–100)
PO2 ARTERIAL: 50 MMHG (ref 80–100)
PO2 ARTERIAL: 55 MMHG (ref 80–100)
PO2 ARTERIAL: 59 MMHG (ref 80–100)
PO2 ARTERIAL: 65 MMHG (ref 80–100)
PO2 ARTERIAL: 66 MMHG (ref 80–100)
PO2 ARTERIAL: 67 MMHG (ref 80–100)
PO2 ARTERIAL: 74 MMHG (ref 80–100)
PO2 ARTERIAL: 77 MMHG (ref 80–100)
PO2 ARTERIAL: 85 MMHG (ref 80–100)
PO2 ARTERIAL: 86 MMHG (ref 80–100)
POLYCHROMASIA: ABNORMAL
POTASSIUM SERPL-SCNC: 3.3 MMOL/L (ref 3.5–5)
POTASSIUM SERPL-SCNC: 3.4 MMOL/L (ref 3.5–5)
POTASSIUM SERPL-SCNC: 3.6 MMOL/L (ref 3.5–5)
POTASSIUM SERPL-SCNC: 3.9 MMOL/L (ref 3.5–5)
POTASSIUM SERPL-SCNC: 3.9 MMOL/L (ref 3.5–5)
POTASSIUM SERPL-SCNC: 4 MMOL/L (ref 3.5–5)
POTASSIUM SERPL-SCNC: 4.1 MMOL/L (ref 3.5–5)
POTASSIUM SERPL-SCNC: 4.4 MMOL/L (ref 3.5–5)
POTASSIUM SERPL-SCNC: 4.4 MMOL/L (ref 3.5–5)
POTASSIUM SERPL-SCNC: 4.8 MMOL/L (ref 3.5–5)
POTASSIUM SERPL-SCNC: 4.9 MMOL/L (ref 3.5–5)
POTASSIUM SERPL-SCNC: 5.3 MMOL/L (ref 3.5–5)
POTASSIUM SERPL-SCNC: 5.5 MMOL/L (ref 3.5–5)
POTASSIUM SERPL-SCNC: 5.7 MMOL/L (ref 3.5–5)
POTASSIUM SERPL-SCNC: 6 MMOL/L (ref 3.5–5)
POTASSIUM, WHOLE BLOOD: 3.1
POTASSIUM, WHOLE BLOOD: 3.7
POTASSIUM, WHOLE BLOOD: 3.9
POTASSIUM, WHOLE BLOOD: 4.1
POTASSIUM, WHOLE BLOOD: 4.3
POTASSIUM, WHOLE BLOOD: 4.6
POTASSIUM, WHOLE BLOOD: 4.9
POTASSIUM, WHOLE BLOOD: 5.3
POTASSIUM, WHOLE BLOOD: 5.7
POTASSIUM, WHOLE BLOOD: 5.9
PRO-BNP: 1309 PG/ML (ref 0–1800)
PRO-BNP: 1437 PG/ML (ref 0–1800)
PRO-BNP: 3154 PG/ML (ref 0–1800)
PRO-BNP: 680 PG/ML (ref 0–1800)
PRO-BNP: 7678 PG/ML (ref 0–1800)
PROTEIN PROTEIN: 52 MG/DL (ref 15–45)
PROTEIN UA: 30 MG/DL
RBC # BLD: 2.92 M/UL (ref 4.7–6.1)
RBC # BLD: 3 M/UL (ref 4.7–6.1)
RBC # BLD: 3.27 M/UL (ref 4.7–6.1)
RBC # BLD: 3.47 M/UL (ref 4.7–6.1)
RBC # BLD: 3.74 M/UL (ref 4.7–6.1)
RBC # BLD: 3.79 M/UL (ref 4.7–6.1)
RBC # BLD: 4.38 M/UL (ref 4.7–6.1)
RBC # BLD: 4.43 M/UL (ref 4.7–6.1)
RBC # BLD: 4.53 M/UL (ref 4.7–6.1)
RBC # BLD: 4.66 M/UL (ref 4.7–6.1)
RBC # BLD: 4.75 M/UL (ref 4.7–6.1)
RBC # BLD: 4.82 M/UL (ref 4.7–6.1)
RBC # BLD: 4.86 M/UL (ref 4.7–6.1)
RBC # BLD: 4.94 M/UL (ref 4.7–6.1)
RBC # BLD: 5.13 M/UL (ref 4.7–6.1)
RBC UA: ABNORMAL /HPF (ref 0–2)
SODIUM BLD-SCNC: 133 MMOL/L (ref 136–145)
SODIUM BLD-SCNC: 134 MMOL/L (ref 136–145)
SODIUM BLD-SCNC: 136 MMOL/L (ref 136–145)
SODIUM BLD-SCNC: 136 MMOL/L (ref 136–145)
SODIUM BLD-SCNC: 137 MMOL/L (ref 136–145)
SODIUM BLD-SCNC: 139 MMOL/L (ref 136–145)
SODIUM BLD-SCNC: 139 MMOL/L (ref 136–145)
SODIUM BLD-SCNC: 140 MMOL/L (ref 136–145)
SODIUM BLD-SCNC: 141 MMOL/L (ref 136–145)
SODIUM BLD-SCNC: 141 MMOL/L (ref 136–145)
SODIUM BLD-SCNC: 143 MMOL/L (ref 136–145)
SODIUM BLD-SCNC: 146 MMOL/L (ref 136–145)
SODIUM BLD-SCNC: 147 MMOL/L (ref 136–145)
SODIUM BLD-SCNC: 148 MMOL/L (ref 136–145)
SODIUM BLD-SCNC: 150 MMOL/L (ref 136–145)
SODIUM BLD-SCNC: 153 MMOL/L (ref 136–145)
SODIUM BLD-SCNC: 155 MMOL/L (ref 136–145)
SODIUM URINE: <20 MMOL/L
SPECIFIC GRAVITY UA: 1.02
TEAR DROP CELLS: ABNORMAL
TOTAL PROTEIN: 4.7 G/DL (ref 6.6–8.7)
TOTAL PROTEIN: 5 G/DL (ref 6.6–8.7)
TOTAL PROTEIN: 5.3 G/DL (ref 6.6–8.7)
TOTAL PROTEIN: 5.8 G/DL (ref 6.6–8.7)
TOTAL PROTEIN: 6.2 G/DL (ref 6.6–8.7)
TOTAL PROTEIN: 6.2 G/DL (ref 6.6–8.7)
TOTAL PROTEIN: 6.5 G/DL (ref 6.6–8.7)
TOTAL PROTEIN: 8 G/DL (ref 6.6–8.7)
TOXIC GRANULATION: ABNORMAL
TROPONIN: 0.05 NG/ML (ref 0–0.03)
TROPONIN: <0.01 NG/ML (ref 0–0.03)
TSH SERPL DL<=0.05 MIU/L-ACNC: 2.72 UIU/ML (ref 0.27–4.2)
URIC ACID, SERUM: 7.8 MG/DL (ref 3.4–7)
UROBILINOGEN, URINE: 0.2 E.U./DL
VANCOMYCIN RANDOM: 10.5 UG/ML
VANCOMYCIN RANDOM: 15.5 UG/ML
VANCOMYCIN RANDOM: 23.5 UG/ML
VANCOMYCIN RANDOM: 4.6 UG/ML
VANCOMYCIN TROUGH: 19.2 UG/ML (ref 10–20)
VITAMIN B-12: 498 PG/ML (ref 211–946)
VITAMIN D 25-HYDROXY: 19.2 NG/ML
VITAMIN D 25-HYDROXY: 22.3 NG/ML
WBC # BLD: 10.1 K/UL (ref 4.8–10.8)
WBC # BLD: 10.2 K/UL (ref 4.8–10.8)
WBC # BLD: 10.5 K/UL (ref 4.8–10.8)
WBC # BLD: 10.9 K/UL (ref 4.8–10.8)
WBC # BLD: 11.9 K/UL (ref 4.8–10.8)
WBC # BLD: 12.8 K/UL (ref 4.8–10.8)
WBC # BLD: 15.9 K/UL (ref 4.8–10.8)
WBC # BLD: 20.8 K/UL (ref 4.8–10.8)
WBC # BLD: 22 K/UL (ref 4.8–10.8)
WBC # BLD: 23.6 K/UL (ref 4.8–10.8)
WBC # BLD: 28.9 K/UL (ref 4.8–10.8)
WBC # BLD: 31.5 K/UL (ref 4.8–10.8)
WBC # BLD: 38.2 K/UL (ref 4.8–10.8)
WBC # BLD: 40 K/UL (ref 4.8–10.8)
WBC # BLD: 42.1 K/UL (ref 4.8–10.8)

## 2018-01-01 PROCEDURE — 2500000003 HC RX 250 WO HCPCS: Performed by: INTERNAL MEDICINE

## 2018-01-01 PROCEDURE — 6360000002 HC RX W HCPCS: Performed by: NURSE PRACTITIONER

## 2018-01-01 PROCEDURE — 6370000000 HC RX 637 (ALT 250 FOR IP): Performed by: INTERNAL MEDICINE

## 2018-01-01 PROCEDURE — 6370000000 HC RX 637 (ALT 250 FOR IP): Performed by: PSYCHIATRY & NEUROLOGY

## 2018-01-01 PROCEDURE — 6360000002 HC RX W HCPCS: Performed by: INTERNAL MEDICINE

## 2018-01-01 PROCEDURE — 6370000000 HC RX 637 (ALT 250 FOR IP): Performed by: FAMILY MEDICINE

## 2018-01-01 PROCEDURE — 2580000003 HC RX 258: Performed by: FAMILY MEDICINE

## 2018-01-01 PROCEDURE — 6360000002 HC RX W HCPCS: Performed by: FAMILY MEDICINE

## 2018-01-01 PROCEDURE — 82948 REAGENT STRIP/BLOOD GLUCOSE: CPT

## 2018-01-01 PROCEDURE — 84484 ASSAY OF TROPONIN QUANT: CPT

## 2018-01-01 PROCEDURE — 36600 WITHDRAWAL OF ARTERIAL BLOOD: CPT

## 2018-01-01 PROCEDURE — 76937 US GUIDE VASCULAR ACCESS: CPT

## 2018-01-01 PROCEDURE — 2700000000 HC OXYGEN THERAPY PER DAY

## 2018-01-01 PROCEDURE — 83036 HEMOGLOBIN GLYCOSYLATED A1C: CPT

## 2018-01-01 PROCEDURE — 94640 AIRWAY INHALATION TREATMENT: CPT

## 2018-01-01 PROCEDURE — 2580000003 HC RX 258: Performed by: INTERNAL MEDICINE

## 2018-01-01 PROCEDURE — 80202 ASSAY OF VANCOMYCIN: CPT

## 2018-01-01 PROCEDURE — 1210000000 HC MED SURG R&B

## 2018-01-01 PROCEDURE — 2500000003 HC RX 250 WO HCPCS: Performed by: FAMILY MEDICINE

## 2018-01-01 PROCEDURE — 6360000002 HC RX W HCPCS

## 2018-01-01 PROCEDURE — 2100000000 HC CCU R&B

## 2018-01-01 PROCEDURE — 83735 ASSAY OF MAGNESIUM: CPT

## 2018-01-01 PROCEDURE — 82803 BLOOD GASES ANY COMBINATION: CPT

## 2018-01-01 PROCEDURE — S0028 INJECTION, FAMOTIDINE, 20 MG: HCPCS | Performed by: FAMILY MEDICINE

## 2018-01-01 PROCEDURE — 0BH17EZ INSERTION OF ENDOTRACHEAL AIRWAY INTO TRACHEA, VIA NATURAL OR ARTIFICIAL OPENING: ICD-10-PCS | Performed by: FAMILY MEDICINE

## 2018-01-01 PROCEDURE — 80048 BASIC METABOLIC PNL TOTAL CA: CPT

## 2018-01-01 PROCEDURE — 71045 X-RAY EXAM CHEST 1 VIEW: CPT

## 2018-01-01 PROCEDURE — 31720 CLEARANCE OF AIRWAYS: CPT

## 2018-01-01 PROCEDURE — 83880 ASSAY OF NATRIURETIC PEPTIDE: CPT

## 2018-01-01 PROCEDURE — 99231 SBSQ HOSP IP/OBS SF/LOW 25: CPT | Performed by: INTERNAL MEDICINE

## 2018-01-01 PROCEDURE — 80053 COMPREHEN METABOLIC PANEL: CPT

## 2018-01-01 PROCEDURE — G8978 MOBILITY CURRENT STATUS: HCPCS

## 2018-01-01 PROCEDURE — 92526 ORAL FUNCTION THERAPY: CPT

## 2018-01-01 PROCEDURE — 92610 EVALUATE SWALLOWING FUNCTION: CPT

## 2018-01-01 PROCEDURE — 85025 COMPLETE CBC W/AUTO DIFF WBC: CPT

## 2018-01-01 PROCEDURE — 84300 ASSAY OF URINE SODIUM: CPT

## 2018-01-01 PROCEDURE — 84132 ASSAY OF SERUM POTASSIUM: CPT

## 2018-01-01 PROCEDURE — 94003 VENT MGMT INPAT SUBQ DAY: CPT

## 2018-01-01 PROCEDURE — 81001 URINALYSIS AUTO W/SCOPE: CPT

## 2018-01-01 PROCEDURE — 82607 VITAMIN B-12: CPT

## 2018-01-01 PROCEDURE — 87205 SMEAR GRAM STAIN: CPT

## 2018-01-01 PROCEDURE — 94660 CPAP INITIATION&MGMT: CPT

## 2018-01-01 PROCEDURE — 31500 INSERT EMERGENCY AIRWAY: CPT

## 2018-01-01 PROCEDURE — 83970 ASSAY OF PARATHORMONE: CPT

## 2018-01-01 PROCEDURE — G8997 SWALLOW GOAL STATUS: HCPCS

## 2018-01-01 PROCEDURE — 51702 INSERT TEMP BLADDER CATH: CPT

## 2018-01-01 PROCEDURE — 84550 ASSAY OF BLOOD/URIC ACID: CPT

## 2018-01-01 PROCEDURE — 99232 SBSQ HOSP IP/OBS MODERATE 35: CPT | Performed by: INTERNAL MEDICINE

## 2018-01-01 PROCEDURE — 87070 CULTURE OTHR SPECIMN AEROBIC: CPT

## 2018-01-01 PROCEDURE — 94002 VENT MGMT INPAT INIT DAY: CPT

## 2018-01-01 PROCEDURE — 84100 ASSAY OF PHOSPHORUS: CPT

## 2018-01-01 PROCEDURE — 99223 1ST HOSP IP/OBS HIGH 75: CPT | Performed by: PSYCHIATRY & NEUROLOGY

## 2018-01-01 PROCEDURE — 36415 COLL VENOUS BLD VENIPUNCTURE: CPT

## 2018-01-01 PROCEDURE — 84443 ASSAY THYROID STIM HORMONE: CPT

## 2018-01-01 PROCEDURE — 87324 CLOSTRIDIUM AG IA: CPT

## 2018-01-01 PROCEDURE — 99223 1ST HOSP IP/OBS HIGH 75: CPT | Performed by: INTERNAL MEDICINE

## 2018-01-01 PROCEDURE — 51798 US URINE CAPACITY MEASURE: CPT

## 2018-01-01 PROCEDURE — 6370000000 HC RX 637 (ALT 250 FOR IP): Performed by: CLINICAL NURSE SPECIALIST

## 2018-01-01 PROCEDURE — 02HV33Z INSERTION OF INFUSION DEVICE INTO SUPERIOR VENA CAVA, PERCUTANEOUS APPROACH: ICD-10-PCS | Performed by: FAMILY MEDICINE

## 2018-01-01 PROCEDURE — 84156 ASSAY OF PROTEIN URINE: CPT

## 2018-01-01 PROCEDURE — G8979 MOBILITY GOAL STATUS: HCPCS

## 2018-01-01 PROCEDURE — 93005 ELECTROCARDIOGRAM TRACING: CPT

## 2018-01-01 PROCEDURE — 76770 US EXAM ABDO BACK WALL COMP: CPT

## 2018-01-01 PROCEDURE — 36592 COLLECT BLOOD FROM PICC: CPT

## 2018-01-01 PROCEDURE — 5A1955Z RESPIRATORY VENTILATION, GREATER THAN 96 CONSECUTIVE HOURS: ICD-10-PCS | Performed by: INTERNAL MEDICINE

## 2018-01-01 PROCEDURE — 36569 INSJ PICC 5 YR+ W/O IMAGING: CPT

## 2018-01-01 PROCEDURE — 99231 SBSQ HOSP IP/OBS SF/LOW 25: CPT | Performed by: PHYSICIAN ASSISTANT

## 2018-01-01 PROCEDURE — 82570 ASSAY OF URINE CREATININE: CPT

## 2018-01-01 PROCEDURE — 99238 HOSP IP/OBS DSCHRG MGMT 30/<: CPT | Performed by: PSYCHIATRY & NEUROLOGY

## 2018-01-01 PROCEDURE — 97162 PT EVAL MOD COMPLEX 30 MIN: CPT

## 2018-01-01 PROCEDURE — 1250000000 HC SEMI PRIVATE HOSPICE R&B

## 2018-01-01 PROCEDURE — 99233 SBSQ HOSP IP/OBS HIGH 50: CPT | Performed by: PSYCHIATRY & NEUROLOGY

## 2018-01-01 PROCEDURE — C1751 CATH, INF, PER/CENT/MIDLINE: HCPCS

## 2018-01-01 PROCEDURE — 83605 ASSAY OF LACTIC ACID: CPT

## 2018-01-01 PROCEDURE — 6370000000 HC RX 637 (ALT 250 FOR IP): Performed by: NURSE PRACTITIONER

## 2018-01-01 PROCEDURE — 82306 VITAMIN D 25 HYDROXY: CPT

## 2018-01-01 PROCEDURE — 1240000000 HC EMOTIONAL WELLNESS R&B

## 2018-01-01 PROCEDURE — 87040 BLOOD CULTURE FOR BACTERIA: CPT

## 2018-01-01 PROCEDURE — G0328 FECAL BLOOD SCRN IMMUNOASSAY: HCPCS

## 2018-01-01 PROCEDURE — G8996 SWALLOW CURRENT STATUS: HCPCS

## 2018-01-01 PROCEDURE — 93306 TTE W/DOPPLER COMPLETE: CPT

## 2018-01-01 PROCEDURE — 99222 1ST HOSP IP/OBS MODERATE 55: CPT | Performed by: INTERNAL MEDICINE

## 2018-01-01 RX ORDER — PROPOFOL 10 MG/ML
10 INJECTION, EMULSION INTRAVENOUS
Status: DISCONTINUED | OUTPATIENT
Start: 2018-01-01 | End: 2018-01-01 | Stop reason: HOSPADM

## 2018-01-01 RX ORDER — FUROSEMIDE 10 MG/ML
40 INJECTION INTRAMUSCULAR; INTRAVENOUS DAILY
Status: COMPLETED | OUTPATIENT
Start: 2018-01-01 | End: 2018-01-01

## 2018-01-01 RX ORDER — ATROPINE SULFATE 10 MG/ML
2 SOLUTION/ DROPS OPHTHALMIC EVERY 4 HOURS PRN
Status: DISCONTINUED | OUTPATIENT
Start: 2018-01-01 | End: 2018-01-01 | Stop reason: HOSPADM

## 2018-01-01 RX ORDER — DILTIAZEM HYDROCHLORIDE 5 MG/ML
10 INJECTION INTRAVENOUS ONCE
Status: COMPLETED | OUTPATIENT
Start: 2018-01-01 | End: 2018-01-01

## 2018-01-01 RX ORDER — ACETAMINOPHEN 325 MG/1
650 TABLET ORAL EVERY 4 HOURS PRN
Status: DISCONTINUED | OUTPATIENT
Start: 2018-01-01 | End: 2018-01-01 | Stop reason: SDUPTHER

## 2018-01-01 RX ORDER — ETOMIDATE 2 MG/ML
20 INJECTION INTRAVENOUS ONCE
Status: COMPLETED | OUTPATIENT
Start: 2018-01-01 | End: 2018-01-01

## 2018-01-01 RX ORDER — VANCOMYCIN HYDROCHLORIDE 1 G/200ML
1000 INJECTION, SOLUTION INTRAVENOUS ONCE
Status: COMPLETED | OUTPATIENT
Start: 2018-01-01 | End: 2018-01-01

## 2018-01-01 RX ORDER — GUAIFENESIN 600 MG/1
1200 TABLET, EXTENDED RELEASE ORAL 2 TIMES DAILY
Status: DISCONTINUED | OUTPATIENT
Start: 2018-01-01 | End: 2018-01-01

## 2018-01-01 RX ORDER — LORAZEPAM 2 MG/ML
2 INJECTION INTRAMUSCULAR
Status: CANCELLED | OUTPATIENT
Start: 2018-01-01

## 2018-01-01 RX ORDER — DEXTROSE MONOHYDRATE 25 G/50ML
25 INJECTION, SOLUTION INTRAVENOUS ONCE
Status: COMPLETED | OUTPATIENT
Start: 2018-01-01 | End: 2018-01-01

## 2018-01-01 RX ORDER — LEVOFLOXACIN 5 MG/ML
750 INJECTION, SOLUTION INTRAVENOUS
Status: DISCONTINUED | OUTPATIENT
Start: 2018-01-01 | End: 2018-01-01

## 2018-01-01 RX ORDER — SODIUM CHLORIDE 0.9 % (FLUSH) 0.9 %
10 SYRINGE (ML) INJECTION EVERY 12 HOURS SCHEDULED
Status: DISCONTINUED | OUTPATIENT
Start: 2018-01-01 | End: 2018-01-01 | Stop reason: HOSPADM

## 2018-01-01 RX ORDER — MORPHINE SULFATE/0.9% NACL/PF 1 MG/ML
2 SYRINGE (ML) INJECTION
Status: DISCONTINUED | OUTPATIENT
Start: 2018-01-01 | End: 2018-01-01 | Stop reason: HOSPADM

## 2018-01-01 RX ORDER — IPRATROPIUM BROMIDE AND ALBUTEROL SULFATE 2.5; .5 MG/3ML; MG/3ML
1 SOLUTION RESPIRATORY (INHALATION) 4 TIMES DAILY
Status: DISCONTINUED | OUTPATIENT
Start: 2018-01-01 | End: 2018-01-01

## 2018-01-01 RX ORDER — ACETAMINOPHEN 650 MG/1
650 SUPPOSITORY RECTAL EVERY 6 HOURS PRN
Status: CANCELLED | OUTPATIENT
Start: 2018-01-01

## 2018-01-01 RX ORDER — DEXTROSE MONOHYDRATE 25 G/50ML
12.5 INJECTION, SOLUTION INTRAVENOUS PRN
Status: DISCONTINUED | OUTPATIENT
Start: 2018-01-01 | End: 2018-01-01 | Stop reason: HOSPADM

## 2018-01-01 RX ORDER — PRAVASTATIN SODIUM 20 MG
40 TABLET ORAL DAILY
Status: DISCONTINUED | OUTPATIENT
Start: 2018-01-01 | End: 2018-01-01 | Stop reason: HOSPADM

## 2018-01-01 RX ORDER — FUROSEMIDE 40 MG/1
40 TABLET ORAL ONCE
Status: DISCONTINUED | OUTPATIENT
Start: 2018-01-01 | End: 2018-01-01 | Stop reason: HOSPADM

## 2018-01-01 RX ORDER — PROPRANOLOL HYDROCHLORIDE 40 MG/1
40 TABLET ORAL 2 TIMES DAILY PRN
Status: DISCONTINUED | OUTPATIENT
Start: 2018-01-01 | End: 2018-01-01 | Stop reason: HOSPADM

## 2018-01-01 RX ORDER — IPRATROPIUM BROMIDE AND ALBUTEROL SULFATE 2.5; .5 MG/3ML; MG/3ML
1 SOLUTION RESPIRATORY (INHALATION)
Status: DISCONTINUED | OUTPATIENT
Start: 2018-01-01 | End: 2018-01-01

## 2018-01-01 RX ORDER — LORAZEPAM 2 MG/ML
2 INJECTION INTRAMUSCULAR
Status: DISCONTINUED | OUTPATIENT
Start: 2018-01-01 | End: 2018-01-01 | Stop reason: HOSPADM

## 2018-01-01 RX ORDER — GUAIFENESIN 100 MG/5ML
200 SOLUTION ORAL 4 TIMES DAILY
Status: DISCONTINUED | OUTPATIENT
Start: 2018-01-01 | End: 2018-01-01

## 2018-01-01 RX ORDER — PRAVASTATIN SODIUM 40 MG
40 TABLET ORAL DAILY
COMMUNITY

## 2018-01-01 RX ORDER — RISPERIDONE 0.25 MG/1
0.25 TABLET, FILM COATED ORAL 2 TIMES DAILY
Status: DISCONTINUED | OUTPATIENT
Start: 2018-01-01 | End: 2018-01-01 | Stop reason: SDUPTHER

## 2018-01-01 RX ORDER — LEVOFLOXACIN 5 MG/ML
500 INJECTION, SOLUTION INTRAVENOUS EVERY 24 HOURS
Status: DISCONTINUED | OUTPATIENT
Start: 2018-01-01 | End: 2018-01-01

## 2018-01-01 RX ORDER — FUROSEMIDE 40 MG/1
40 TABLET ORAL DAILY
Status: DISCONTINUED | OUTPATIENT
Start: 2018-01-01 | End: 2018-01-01

## 2018-01-01 RX ORDER — INSULIN GLARGINE 100 [IU]/ML
15 INJECTION, SOLUTION SUBCUTANEOUS NIGHTLY
Status: DISCONTINUED | OUTPATIENT
Start: 2018-01-01 | End: 2018-01-01 | Stop reason: HOSPADM

## 2018-01-01 RX ORDER — FUROSEMIDE 10 MG/ML
40 INJECTION INTRAMUSCULAR; INTRAVENOUS ONCE
Status: COMPLETED | OUTPATIENT
Start: 2018-01-01 | End: 2018-01-01

## 2018-01-01 RX ORDER — GUAIFENESIN 600 MG/1
1200 TABLET, EXTENDED RELEASE ORAL 2 TIMES DAILY
Status: CANCELLED | OUTPATIENT
Start: 2018-01-01

## 2018-01-01 RX ORDER — PROPRANOLOL HYDROCHLORIDE 40 MG/1
40 TABLET ORAL 2 TIMES DAILY
Status: DISCONTINUED | OUTPATIENT
Start: 2018-01-01 | End: 2018-01-01

## 2018-01-01 RX ORDER — RISPERIDONE 0.25 MG/1
0.25 TABLET, FILM COATED ORAL DAILY
Status: DISCONTINUED | OUTPATIENT
Start: 2018-01-01 | End: 2018-01-01

## 2018-01-01 RX ORDER — PROPRANOLOL HYDROCHLORIDE 40 MG/1
40 TABLET ORAL 2 TIMES DAILY
COMMUNITY

## 2018-01-01 RX ORDER — 0.9 % SODIUM CHLORIDE 0.9 %
200 INTRAVENOUS SOLUTION INTRAVENOUS ONCE
Status: COMPLETED | OUTPATIENT
Start: 2018-01-01 | End: 2018-01-01

## 2018-01-01 RX ORDER — GLIMEPIRIDE 1 MG/1
1.5 TABLET ORAL
Status: CANCELLED | OUTPATIENT
Start: 2018-01-01

## 2018-01-01 RX ORDER — SODIUM CHLORIDE 9 MG/ML
INJECTION, SOLUTION INTRAVENOUS CONTINUOUS
Status: DISCONTINUED | OUTPATIENT
Start: 2018-01-01 | End: 2018-01-01

## 2018-01-01 RX ORDER — ACETAMINOPHEN 325 MG/1
650 TABLET ORAL EVERY 4 HOURS PRN
Status: DISCONTINUED | OUTPATIENT
Start: 2018-01-01 | End: 2018-01-01 | Stop reason: HOSPADM

## 2018-01-01 RX ORDER — SODIUM POLYSTYRENE SULFONATE 15 G/60ML
15 SUSPENSION ORAL; RECTAL ONCE
Status: COMPLETED | OUTPATIENT
Start: 2018-01-01 | End: 2018-01-01

## 2018-01-01 RX ORDER — DEXTROSE AND POTASSIUM CHLORIDE 5; .15 G/100ML; G/100ML
SOLUTION INTRAVENOUS CONTINUOUS
Status: DISCONTINUED | OUTPATIENT
Start: 2018-01-01 | End: 2018-01-01

## 2018-01-01 RX ORDER — LIDOCAINE HYDROCHLORIDE 10 MG/ML
5 INJECTION, SOLUTION EPIDURAL; INFILTRATION; INTRACAUDAL; PERINEURAL ONCE
Status: COMPLETED | OUTPATIENT
Start: 2018-01-01 | End: 2018-01-01

## 2018-01-01 RX ORDER — DIGOXIN 0.25 MG/ML
INJECTION INTRAMUSCULAR; INTRAVENOUS
Status: COMPLETED
Start: 2018-01-01 | End: 2018-01-01

## 2018-01-01 RX ORDER — ACETYLCYSTEINE 200 MG/ML
600 SOLUTION ORAL; RESPIRATORY (INHALATION) 2 TIMES DAILY
Status: DISCONTINUED | OUTPATIENT
Start: 2018-01-01 | End: 2018-01-01

## 2018-01-01 RX ORDER — PROPRANOLOL HYDROCHLORIDE 10 MG/1
40 TABLET ORAL 2 TIMES DAILY
Status: DISCONTINUED | OUTPATIENT
Start: 2018-01-01 | End: 2018-01-01 | Stop reason: HOSPADM

## 2018-01-01 RX ORDER — SODIUM CHLORIDE 0.9 % (FLUSH) 0.9 %
10 SYRINGE (ML) INJECTION PRN
Status: DISCONTINUED | OUTPATIENT
Start: 2018-01-01 | End: 2018-01-01 | Stop reason: HOSPADM

## 2018-01-01 RX ORDER — PRAVASTATIN SODIUM 20 MG
40 TABLET ORAL DAILY
Status: DISCONTINUED | OUTPATIENT
Start: 2018-01-01 | End: 2018-01-01 | Stop reason: SDUPTHER

## 2018-01-01 RX ORDER — FUROSEMIDE 40 MG/1
40 TABLET ORAL DAILY
COMMUNITY
Start: 2018-01-01

## 2018-01-01 RX ORDER — IPRATROPIUM BROMIDE AND ALBUTEROL SULFATE 2.5; .5 MG/3ML; MG/3ML
1 SOLUTION RESPIRATORY (INHALATION) EVERY 4 HOURS
COMMUNITY

## 2018-01-01 RX ORDER — RISPERIDONE 0.5 MG/1
0.25 TABLET, FILM COATED ORAL DAILY
Status: CANCELLED | OUTPATIENT
Start: 2018-01-01

## 2018-01-01 RX ORDER — SCOLOPAMINE TRANSDERMAL SYSTEM 1 MG/1
1 PATCH, EXTENDED RELEASE TRANSDERMAL
Status: DISCONTINUED | OUTPATIENT
Start: 2018-01-01 | End: 2018-01-01 | Stop reason: HOSPADM

## 2018-01-01 RX ORDER — POTASSIUM CHLORIDE 7.45 MG/ML
10 INJECTION INTRAVENOUS
Status: COMPLETED | OUTPATIENT
Start: 2018-01-01 | End: 2018-01-01

## 2018-01-01 RX ORDER — PIOGLITAZONEHYDROCHLORIDE 30 MG/1
30 TABLET ORAL
Status: DISCONTINUED | OUTPATIENT
Start: 2018-01-01 | End: 2018-01-01 | Stop reason: HOSPADM

## 2018-01-01 RX ORDER — 0.9 % SODIUM CHLORIDE 0.9 %
500 INTRAVENOUS SOLUTION INTRAVENOUS ONCE
Status: COMPLETED | OUTPATIENT
Start: 2018-01-01 | End: 2018-01-01

## 2018-01-01 RX ORDER — ACETAMINOPHEN 325 MG/1
650 TABLET ORAL EVERY 4 HOURS PRN
COMMUNITY

## 2018-01-01 RX ORDER — IPRATROPIUM BROMIDE AND ALBUTEROL SULFATE 2.5; .5 MG/3ML; MG/3ML
1 SOLUTION RESPIRATORY (INHALATION) ONCE
Status: COMPLETED | OUTPATIENT
Start: 2018-01-01 | End: 2018-01-01

## 2018-01-01 RX ORDER — IPRATROPIUM BROMIDE AND ALBUTEROL SULFATE 2.5; .5 MG/3ML; MG/3ML
1 SOLUTION RESPIRATORY (INHALATION)
Status: CANCELLED | OUTPATIENT
Start: 2018-01-01

## 2018-01-01 RX ORDER — ACETAMINOPHEN 650 MG/1
650 SUPPOSITORY RECTAL EVERY 6 HOURS PRN
Status: DISCONTINUED | OUTPATIENT
Start: 2018-01-01 | End: 2018-01-01 | Stop reason: HOSPADM

## 2018-01-01 RX ORDER — DEXTROSE MONOHYDRATE 50 MG/ML
100 INJECTION, SOLUTION INTRAVENOUS PRN
Status: DISCONTINUED | OUTPATIENT
Start: 2018-01-01 | End: 2018-01-01 | Stop reason: HOSPADM

## 2018-01-01 RX ORDER — ALENDRONATE SODIUM 70 MG/1
70 TABLET ORAL
COMMUNITY

## 2018-01-01 RX ORDER — NICOTINE POLACRILEX 4 MG
15 LOZENGE BUCCAL PRN
Status: DISCONTINUED | OUTPATIENT
Start: 2018-01-01 | End: 2018-01-01 | Stop reason: HOSPADM

## 2018-01-01 RX ORDER — RISPERIDONE 0.5 MG/1
0.25 TABLET, FILM COATED ORAL DAILY
Status: DISCONTINUED | OUTPATIENT
Start: 2018-01-01 | End: 2018-01-01 | Stop reason: HOSPADM

## 2018-01-01 RX ORDER — PROPRANOLOL HYDROCHLORIDE 10 MG/1
40 TABLET ORAL 2 TIMES DAILY
Status: CANCELLED | OUTPATIENT
Start: 2018-01-01

## 2018-01-01 RX ORDER — DILTIAZEM HYDROCHLORIDE 120 MG/1
120 CAPSULE, COATED, EXTENDED RELEASE ORAL 2 TIMES DAILY
Status: DISCONTINUED | OUTPATIENT
Start: 2018-01-01 | End: 2018-01-01

## 2018-01-01 RX ORDER — PROPOFOL 10 MG/ML
INJECTION, EMULSION INTRAVENOUS
Status: COMPLETED
Start: 2018-01-01 | End: 2018-01-01

## 2018-01-01 RX ORDER — GUAIFENESIN 600 MG/1
1200 TABLET, EXTENDED RELEASE ORAL 2 TIMES DAILY
COMMUNITY

## 2018-01-01 RX ORDER — PRAVASTATIN SODIUM 20 MG
40 TABLET ORAL DAILY
Status: CANCELLED | OUTPATIENT
Start: 2018-01-01

## 2018-01-01 RX ORDER — DIGOXIN 0.25 MG/ML
500 INJECTION INTRAMUSCULAR; INTRAVENOUS ONCE
Status: COMPLETED | OUTPATIENT
Start: 2018-01-01 | End: 2018-01-01

## 2018-01-01 RX ORDER — MORPHINE SULFATE/0.9% NACL/PF 1 MG/ML
2 SYRINGE (ML) INJECTION
Status: CANCELLED | OUTPATIENT
Start: 2018-01-01

## 2018-01-01 RX ORDER — PIOGLITAZONEHYDROCHLORIDE 15 MG/1
30 TABLET ORAL
Status: DISCONTINUED | OUTPATIENT
Start: 2018-01-01 | End: 2018-01-01

## 2018-01-01 RX ORDER — PROPRANOLOL HYDROCHLORIDE 40 MG/1
40 TABLET ORAL 2 TIMES DAILY
Status: DISCONTINUED | OUTPATIENT
Start: 2018-01-01 | End: 2018-01-01 | Stop reason: SDUPTHER

## 2018-01-01 RX ORDER — GLIMEPIRIDE 2 MG/1
1.5 TABLET ORAL
COMMUNITY

## 2018-01-01 RX ORDER — PIOGLITAZONEHYDROCHLORIDE 30 MG/1
30 TABLET ORAL
COMMUNITY

## 2018-01-01 RX ORDER — ACETAMINOPHEN 650 MG/1
650 SUPPOSITORY RECTAL EVERY 4 HOURS PRN
Status: DISCONTINUED | OUTPATIENT
Start: 2018-01-01 | End: 2018-01-01 | Stop reason: HOSPADM

## 2018-01-01 RX ORDER — POTASSIUM CHLORIDE AND SODIUM CHLORIDE 450; 150 MG/100ML; MG/100ML
INJECTION, SOLUTION INTRAVENOUS CONTINUOUS
Status: DISCONTINUED | OUTPATIENT
Start: 2018-01-01 | End: 2018-01-01

## 2018-01-01 RX ORDER — SCOLOPAMINE TRANSDERMAL SYSTEM 1 MG/1
1 PATCH, EXTENDED RELEASE TRANSDERMAL
Status: CANCELLED | OUTPATIENT
Start: 2018-01-01

## 2018-01-01 RX ORDER — GLIMEPIRIDE 1 MG/1
1.5 TABLET ORAL
Status: DISCONTINUED | OUTPATIENT
Start: 2018-01-01 | End: 2018-01-01 | Stop reason: HOSPADM

## 2018-01-01 RX ORDER — GUAIFENESIN 600 MG/1
1200 TABLET, EXTENDED RELEASE ORAL 2 TIMES DAILY
Status: DISCONTINUED | OUTPATIENT
Start: 2018-01-01 | End: 2018-01-01 | Stop reason: HOSPADM

## 2018-01-01 RX ORDER — ALENDRONATE SODIUM 70 MG/1
70 TABLET ORAL
Status: DISCONTINUED | OUTPATIENT
Start: 2018-01-01 | End: 2018-01-01 | Stop reason: RX

## 2018-01-01 RX ORDER — PIOGLITAZONEHYDROCHLORIDE 30 MG/1
30 TABLET ORAL
Status: CANCELLED | OUTPATIENT
Start: 2018-01-01

## 2018-01-01 RX ORDER — GLIMEPIRIDE 1 MG/1
1.5 TABLET ORAL
Status: DISCONTINUED | OUTPATIENT
Start: 2018-01-01 | End: 2018-01-01

## 2018-01-01 RX ORDER — FUROSEMIDE 40 MG/1
40 TABLET ORAL DAILY
Status: CANCELLED | OUTPATIENT
Start: 2018-01-01

## 2018-01-01 RX ORDER — ACETAMINOPHEN 325 MG/1
650 TABLET ORAL EVERY 4 HOURS PRN
Status: CANCELLED | OUTPATIENT
Start: 2018-01-01

## 2018-01-01 RX ORDER — IPRATROPIUM BROMIDE AND ALBUTEROL SULFATE 2.5; .5 MG/3ML; MG/3ML
1 SOLUTION RESPIRATORY (INHALATION) EVERY 4 HOURS
Status: DISCONTINUED | OUTPATIENT
Start: 2018-01-01 | End: 2018-01-01 | Stop reason: SDUPTHER

## 2018-01-01 RX ORDER — BISACODYL 10 MG
10 SUPPOSITORY, RECTAL RECTAL DAILY PRN
Status: DISCONTINUED | OUTPATIENT
Start: 2018-01-01 | End: 2018-01-01 | Stop reason: HOSPADM

## 2018-01-01 RX ORDER — IPRATROPIUM BROMIDE AND ALBUTEROL SULFATE 2.5; .5 MG/3ML; MG/3ML
1 SOLUTION RESPIRATORY (INHALATION)
Status: DISCONTINUED | OUTPATIENT
Start: 2018-01-01 | End: 2018-01-01 | Stop reason: HOSPADM

## 2018-01-01 RX ORDER — 0.9 % SODIUM CHLORIDE 0.9 %
250 INTRAVENOUS SOLUTION INTRAVENOUS ONCE
Status: COMPLETED | OUTPATIENT
Start: 2018-01-01 | End: 2018-01-01

## 2018-01-01 RX ORDER — GUAIFENESIN 600 MG/1
1200 TABLET, EXTENDED RELEASE ORAL 2 TIMES DAILY
Status: DISCONTINUED | OUTPATIENT
Start: 2018-01-01 | End: 2018-01-01 | Stop reason: SDUPTHER

## 2018-01-01 RX ORDER — FUROSEMIDE 10 MG/ML
40 INJECTION INTRAMUSCULAR; INTRAVENOUS 2 TIMES DAILY
Status: DISCONTINUED | OUTPATIENT
Start: 2018-01-01 | End: 2018-01-01

## 2018-01-01 RX ORDER — LEVOFLOXACIN 5 MG/ML
500 INJECTION, SOLUTION INTRAVENOUS EVERY 24 HOURS
Status: DISCONTINUED | OUTPATIENT
Start: 2018-01-01 | End: 2018-01-01 | Stop reason: DRUGHIGH

## 2018-01-01 RX ORDER — PRAVASTATIN SODIUM 20 MG
40 TABLET ORAL DAILY
Status: DISCONTINUED | OUTPATIENT
Start: 2018-01-01 | End: 2018-01-01

## 2018-01-01 RX ORDER — RISPERIDONE 0.25 MG/1
0.25 TABLET, FILM COATED ORAL 2 TIMES DAILY
COMMUNITY

## 2018-01-01 RX ADMIN — DILTIAZEM HYDROCHLORIDE 30 MG: 30 TABLET, FILM COATED ORAL at 00:17

## 2018-01-01 RX ADMIN — IPRATROPIUM BROMIDE AND ALBUTEROL SULFATE 1 AMPULE: 2.5; .5 SOLUTION RESPIRATORY (INHALATION) at 14:40

## 2018-01-01 RX ADMIN — IPRATROPIUM BROMIDE AND ALBUTEROL SULFATE 1 AMPULE: 2.5; .5 SOLUTION RESPIRATORY (INHALATION) at 06:38

## 2018-01-01 RX ADMIN — INSULIN LISPRO 3 UNITS: 100 INJECTION, SOLUTION INTRAVENOUS; SUBCUTANEOUS at 08:02

## 2018-01-01 RX ADMIN — PROPOFOL 20 MCG/KG/MIN: 10 INJECTION, EMULSION INTRAVENOUS at 17:14

## 2018-01-01 RX ADMIN — DILTIAZEM HYDROCHLORIDE 30 MG: 30 TABLET, FILM COATED ORAL at 11:48

## 2018-01-01 RX ADMIN — TAZOBACTAM SODIUM AND PIPERACILLIN SODIUM 3.38 G: 375; 3 INJECTION, SOLUTION INTRAVENOUS at 15:42

## 2018-01-01 RX ADMIN — ACETYLCYSTEINE: 200 SOLUTION ORAL; RESPIRATORY (INHALATION) at 18:32

## 2018-01-01 RX ADMIN — IPRATROPIUM BROMIDE AND ALBUTEROL SULFATE 1 AMPULE: .5; 3 SOLUTION RESPIRATORY (INHALATION) at 13:58

## 2018-01-01 RX ADMIN — DEXTROSE MONOHYDRATE 25 G: 25 INJECTION, SOLUTION INTRAVENOUS at 09:21

## 2018-01-01 RX ADMIN — RISPERIDONE 0.25 MG: 0.25 TABLET ORAL at 07:59

## 2018-01-01 RX ADMIN — INSULIN LISPRO 2 UNITS: 100 INJECTION, SOLUTION INTRAVENOUS; SUBCUTANEOUS at 21:29

## 2018-01-01 RX ADMIN — PROPRANOLOL HYDROCHLORIDE 40 MG: 10 TABLET ORAL at 16:30

## 2018-01-01 RX ADMIN — VANCOMYCIN HYDROCHLORIDE 1000 MG: 1 INJECTION, SOLUTION INTRAVENOUS at 11:47

## 2018-01-01 RX ADMIN — IPRATROPIUM BROMIDE AND ALBUTEROL SULFATE 1 AMPULE: 2.5; .5 SOLUTION RESPIRATORY (INHALATION) at 19:51

## 2018-01-01 RX ADMIN — IPRATROPIUM BROMIDE AND ALBUTEROL SULFATE 1 AMPULE: .5; 3 SOLUTION RESPIRATORY (INHALATION) at 06:36

## 2018-01-01 RX ADMIN — IPRATROPIUM BROMIDE AND ALBUTEROL SULFATE 1 AMPULE: 2.5; .5 SOLUTION RESPIRATORY (INHALATION) at 10:42

## 2018-01-01 RX ADMIN — DILTIAZEM HYDROCHLORIDE 30 MG: 30 TABLET, FILM COATED ORAL at 18:07

## 2018-01-01 RX ADMIN — GLIMEPIRIDE 1.5 MG: 1 TABLET ORAL at 17:24

## 2018-01-01 RX ADMIN — PROPOFOL 18 MCG/KG/MIN: 10 INJECTION, EMULSION INTRAVENOUS at 15:43

## 2018-01-01 RX ADMIN — IPRATROPIUM BROMIDE AND ALBUTEROL SULFATE 3 ML: .5; 3 SOLUTION RESPIRATORY (INHALATION) at 23:16

## 2018-01-01 RX ADMIN — IPRATROPIUM BROMIDE AND ALBUTEROL SULFATE 1 AMPULE: .5; 3 SOLUTION RESPIRATORY (INHALATION) at 19:46

## 2018-01-01 RX ADMIN — MICONAZOLE NITRATE: 20 POWDER TOPICAL at 07:37

## 2018-01-01 RX ADMIN — INSULIN LISPRO 2 UNITS: 100 INJECTION, SOLUTION INTRAVENOUS; SUBCUTANEOUS at 08:11

## 2018-01-01 RX ADMIN — MEROPENEM 1 G: 1 INJECTION, POWDER, FOR SOLUTION INTRAVENOUS at 07:01

## 2018-01-01 RX ADMIN — IPRATROPIUM BROMIDE AND ALBUTEROL SULFATE 1 AMPULE: .5; 3 SOLUTION RESPIRATORY (INHALATION) at 14:28

## 2018-01-01 RX ADMIN — IPRATROPIUM BROMIDE AND ALBUTEROL SULFATE 1 AMPULE: 2.5; .5 SOLUTION RESPIRATORY (INHALATION) at 19:05

## 2018-01-01 RX ADMIN — TAZOBACTAM SODIUM AND PIPERACILLIN SODIUM 3.38 G: 375; 3 INJECTION, SOLUTION INTRAVENOUS at 14:47

## 2018-01-01 RX ADMIN — FAMOTIDINE 20 MG: 10 INJECTION, SOLUTION INTRAVENOUS at 10:10

## 2018-01-01 RX ADMIN — ACETYLCYSTEINE: 200 SOLUTION ORAL; RESPIRATORY (INHALATION) at 06:02

## 2018-01-01 RX ADMIN — Medication 15 UNITS: at 21:32

## 2018-01-01 RX ADMIN — IPRATROPIUM BROMIDE AND ALBUTEROL SULFATE 1 AMPULE: .5; 3 SOLUTION RESPIRATORY (INHALATION) at 14:37

## 2018-01-01 RX ADMIN — INSULIN LISPRO 2 UNITS: 100 INJECTION, SOLUTION INTRAVENOUS; SUBCUTANEOUS at 13:04

## 2018-01-01 RX ADMIN — FUROSEMIDE 40 MG: 10 INJECTION, SOLUTION INTRAMUSCULAR; INTRAVENOUS at 13:05

## 2018-01-01 RX ADMIN — ENOXAPARIN SODIUM 60 MG: 60 INJECTION SUBCUTANEOUS at 20:40

## 2018-01-01 RX ADMIN — NOREPINEPHRINE BITARTRATE 8 MCG/MIN: 1 INJECTION INTRAVENOUS at 19:47

## 2018-01-01 RX ADMIN — SODIUM CHLORIDE 250 ML: 9 INJECTION, SOLUTION INTRAVENOUS at 19:35

## 2018-01-01 RX ADMIN — POTASSIUM CHLORIDE AND DEXTROSE MONOHYDRATE 100 ML/HR: 150; 5 INJECTION, SOLUTION INTRAVENOUS at 14:53

## 2018-01-01 RX ADMIN — DIGOXIN 500 MCG: 0.25 INJECTION INTRAMUSCULAR; INTRAVENOUS at 17:53

## 2018-01-01 RX ADMIN — VANCOMYCIN HYDROCHLORIDE 1000 MG: 1 INJECTION, SOLUTION INTRAVENOUS at 09:01

## 2018-01-01 RX ADMIN — POTASSIUM CHLORIDE AND DEXTROSE MONOHYDRATE 100 ML/HR: 150; 5 INJECTION, SOLUTION INTRAVENOUS at 01:09

## 2018-01-01 RX ADMIN — GLIMEPIRIDE 1.5 MG: 1 TABLET ORAL at 16:57

## 2018-01-01 RX ADMIN — DILTIAZEM HYDROCHLORIDE 30 MG: 30 TABLET, FILM COATED ORAL at 12:59

## 2018-01-01 RX ADMIN — IPRATROPIUM BROMIDE AND ALBUTEROL SULFATE 1 AMPULE: 2.5; .5 SOLUTION RESPIRATORY (INHALATION) at 06:59

## 2018-01-01 RX ADMIN — GUAIFENESIN 200 MG: 100 SOLUTION ORAL at 21:43

## 2018-01-01 RX ADMIN — IPRATROPIUM BROMIDE AND ALBUTEROL SULFATE 1 AMPULE: .5; 3 SOLUTION RESPIRATORY (INHALATION) at 18:57

## 2018-01-01 RX ADMIN — PROPRANOLOL HYDROCHLORIDE 40 MG: 10 TABLET ORAL at 08:40

## 2018-01-01 RX ADMIN — FAMOTIDINE 20 MG: 10 INJECTION, SOLUTION INTRAVENOUS at 09:37

## 2018-01-01 RX ADMIN — IPRATROPIUM BROMIDE AND ALBUTEROL SULFATE 1 AMPULE: 2.5; .5 SOLUTION RESPIRATORY (INHALATION) at 10:05

## 2018-01-01 RX ADMIN — IPRATROPIUM BROMIDE AND ALBUTEROL SULFATE 1 AMPULE: .5; 3 SOLUTION RESPIRATORY (INHALATION) at 11:22

## 2018-01-01 RX ADMIN — POTASSIUM CHLORIDE AND DEXTROSE MONOHYDRATE 100 ML/HR: 150; 5 INJECTION, SOLUTION INTRAVENOUS at 01:52

## 2018-01-01 RX ADMIN — INSULIN LISPRO 2 UNITS: 100 INJECTION, SOLUTION INTRAVENOUS; SUBCUTANEOUS at 13:07

## 2018-01-01 RX ADMIN — SODIUM CHLORIDE: 9 INJECTION, SOLUTION INTRAVENOUS at 10:35

## 2018-01-01 RX ADMIN — MEROPENEM 1 G: 1 INJECTION, POWDER, FOR SOLUTION INTRAVENOUS at 17:25

## 2018-01-01 RX ADMIN — ACETYLCYSTEINE 800 MG: 200 SOLUTION ORAL; RESPIRATORY (INHALATION) at 07:02

## 2018-01-01 RX ADMIN — TAZOBACTAM SODIUM AND PIPERACILLIN SODIUM 3.38 G: 375; 3 INJECTION, SOLUTION INTRAVENOUS at 00:30

## 2018-01-01 RX ADMIN — INSULIN LISPRO 2 UNITS: 100 INJECTION, SOLUTION INTRAVENOUS; SUBCUTANEOUS at 21:50

## 2018-01-01 RX ADMIN — TAZOBACTAM SODIUM AND PIPERACILLIN SODIUM 3.38 G: 375; 3 INJECTION, SOLUTION INTRAVENOUS at 07:58

## 2018-01-01 RX ADMIN — ACETYLCYSTEINE: 200 SOLUTION ORAL; RESPIRATORY (INHALATION) at 06:08

## 2018-01-01 RX ADMIN — ACETAMINOPHEN 650 MG: 650 SUPPOSITORY RECTAL at 18:39

## 2018-01-01 RX ADMIN — ATROPINE SULFATE 2 DROP: 10 SOLUTION/ DROPS OPHTHALMIC at 12:46

## 2018-01-01 RX ADMIN — TAZOBACTAM SODIUM AND PIPERACILLIN SODIUM 3.38 G: 375; 3 INJECTION, SOLUTION INTRAVENOUS at 23:50

## 2018-01-01 RX ADMIN — INSULIN LISPRO 2 UNITS: 100 INJECTION, SOLUTION INTRAVENOUS; SUBCUTANEOUS at 12:00

## 2018-01-01 RX ADMIN — PROPRANOLOL HYDROCHLORIDE 40 MG: 10 TABLET ORAL at 16:57

## 2018-01-01 RX ADMIN — TAZOBACTAM SODIUM AND PIPERACILLIN SODIUM 3.38 G: 375; 3 INJECTION, SOLUTION INTRAVENOUS at 04:28

## 2018-01-01 RX ADMIN — IPRATROPIUM BROMIDE AND ALBUTEROL SULFATE 3 ML: .5; 3 SOLUTION RESPIRATORY (INHALATION) at 02:56

## 2018-01-01 RX ADMIN — FAMOTIDINE 20 MG: 10 INJECTION, SOLUTION INTRAVENOUS at 08:57

## 2018-01-01 RX ADMIN — INSULIN LISPRO 2 UNITS: 100 INJECTION, SOLUTION INTRAVENOUS; SUBCUTANEOUS at 22:18

## 2018-01-01 RX ADMIN — Medication 15 UNITS: at 20:43

## 2018-01-01 RX ADMIN — IPRATROPIUM BROMIDE AND ALBUTEROL SULFATE 1 AMPULE: .5; 3 SOLUTION RESPIRATORY (INHALATION) at 10:40

## 2018-01-01 RX ADMIN — IPRATROPIUM BROMIDE AND ALBUTEROL SULFATE 1 AMPULE: 2.5; .5 SOLUTION RESPIRATORY (INHALATION) at 18:20

## 2018-01-01 RX ADMIN — ACETAMINOPHEN 650 MG: 650 SUPPOSITORY RECTAL at 22:20

## 2018-01-01 RX ADMIN — VANCOMYCIN HYDROCHLORIDE 750 MG: 10 INJECTION, POWDER, LYOPHILIZED, FOR SOLUTION INTRAVENOUS at 12:57

## 2018-01-01 RX ADMIN — MICONAZOLE NITRATE: 20 POWDER TOPICAL at 09:10

## 2018-01-01 RX ADMIN — ENOXAPARIN SODIUM 60 MG: 60 INJECTION SUBCUTANEOUS at 07:58

## 2018-01-01 RX ADMIN — DILTIAZEM HYDROCHLORIDE 30 MG: 30 TABLET, FILM COATED ORAL at 01:28

## 2018-01-01 RX ADMIN — ACETYLCYSTEINE 600 MG: 200 SOLUTION ORAL; RESPIRATORY (INHALATION) at 06:36

## 2018-01-01 RX ADMIN — IPRATROPIUM BROMIDE AND ALBUTEROL SULFATE 1 AMPULE: 2.5; .5 SOLUTION RESPIRATORY (INHALATION) at 10:17

## 2018-01-01 RX ADMIN — Medication 15 UNITS: at 21:29

## 2018-01-01 RX ADMIN — ACETYLCYSTEINE: 200 SOLUTION ORAL; RESPIRATORY (INHALATION) at 06:07

## 2018-01-01 RX ADMIN — ACETYLCYSTEINE 600 MG: 200 SOLUTION ORAL; RESPIRATORY (INHALATION) at 18:59

## 2018-01-01 RX ADMIN — PROPRANOLOL HYDROCHLORIDE 40 MG: 40 TABLET ORAL at 12:14

## 2018-01-01 RX ADMIN — AZITHROMYCIN MONOHYDRATE 500 MG: 500 INJECTION, POWDER, LYOPHILIZED, FOR SOLUTION INTRAVENOUS at 18:37

## 2018-01-01 RX ADMIN — DIGOXIN 500 MCG: 250 INJECTION, SOLUTION INTRAMUSCULAR; INTRAVENOUS; PARENTERAL at 17:53

## 2018-01-01 RX ADMIN — IPRATROPIUM BROMIDE AND ALBUTEROL SULFATE 1 AMPULE: 2.5; .5 SOLUTION RESPIRATORY (INHALATION) at 07:14

## 2018-01-01 RX ADMIN — LEVOFLOXACIN 500 MG: 5 INJECTION, SOLUTION INTRAVENOUS at 22:29

## 2018-01-01 RX ADMIN — INSULIN LISPRO 2 UNITS: 100 INJECTION, SOLUTION INTRAVENOUS; SUBCUTANEOUS at 08:57

## 2018-01-01 RX ADMIN — DILTIAZEM HYDROCHLORIDE 120 MG: 120 CAPSULE, COATED, EXTENDED RELEASE ORAL at 17:42

## 2018-01-01 RX ADMIN — PROPRANOLOL HYDROCHLORIDE 40 MG: 40 TABLET ORAL at 17:41

## 2018-01-01 RX ADMIN — FUROSEMIDE 40 MG: 10 INJECTION, SOLUTION INTRAMUSCULAR; INTRAVENOUS at 22:29

## 2018-01-01 RX ADMIN — POTASSIUM CHLORIDE AND DEXTROSE MONOHYDRATE 100 ML/HR: 150; 5 INJECTION, SOLUTION INTRAVENOUS at 13:51

## 2018-01-01 RX ADMIN — TAZOBACTAM SODIUM AND PIPERACILLIN SODIUM 3.38 G: 375; 3 INJECTION, SOLUTION INTRAVENOUS at 22:14

## 2018-01-01 RX ADMIN — MICONAZOLE NITRATE: 20 POWDER TOPICAL at 08:00

## 2018-01-01 RX ADMIN — IPRATROPIUM BROMIDE AND ALBUTEROL SULFATE 1 AMPULE: 2.5; .5 SOLUTION RESPIRATORY (INHALATION) at 15:15

## 2018-01-01 RX ADMIN — INSULIN LISPRO 3 UNITS: 100 INJECTION, SOLUTION INTRAVENOUS; SUBCUTANEOUS at 09:13

## 2018-01-01 RX ADMIN — VANCOMYCIN HYDROCHLORIDE 1000 MG: 1 INJECTION, SOLUTION INTRAVENOUS at 16:43

## 2018-01-01 RX ADMIN — IPRATROPIUM BROMIDE AND ALBUTEROL SULFATE 1 AMPULE: 2.5; .5 SOLUTION RESPIRATORY (INHALATION) at 18:23

## 2018-01-01 RX ADMIN — ENOXAPARIN SODIUM 60 MG: 60 INJECTION SUBCUTANEOUS at 21:28

## 2018-01-01 RX ADMIN — PROPRANOLOL HYDROCHLORIDE 40 MG: 40 TABLET ORAL at 08:57

## 2018-01-01 RX ADMIN — ENOXAPARIN SODIUM 60 MG: 60 INJECTION SUBCUTANEOUS at 09:03

## 2018-01-01 RX ADMIN — ENOXAPARIN SODIUM 60 MG: 60 INJECTION SUBCUTANEOUS at 08:37

## 2018-01-01 RX ADMIN — IPRATROPIUM BROMIDE AND ALBUTEROL SULFATE 1 AMPULE: 2.5; .5 SOLUTION RESPIRATORY (INHALATION) at 10:34

## 2018-01-01 RX ADMIN — PROPOFOL 20 MCG/KG/MIN: 10 INJECTION, EMULSION INTRAVENOUS at 20:43

## 2018-01-01 RX ADMIN — IPRATROPIUM BROMIDE AND ALBUTEROL SULFATE 1 AMPULE: 2.5; .5 SOLUTION RESPIRATORY (INHALATION) at 14:07

## 2018-01-01 RX ADMIN — FAMOTIDINE 20 MG: 10 INJECTION, SOLUTION INTRAVENOUS at 09:04

## 2018-01-01 RX ADMIN — DILTIAZEM HYDROCHLORIDE 30 MG: 30 TABLET, FILM COATED ORAL at 00:37

## 2018-01-01 RX ADMIN — ACETYLCYSTEINE: 200 SOLUTION ORAL; RESPIRATORY (INHALATION) at 18:20

## 2018-01-01 RX ADMIN — IPRATROPIUM BROMIDE AND ALBUTEROL SULFATE 1 AMPULE: 2.5; .5 SOLUTION RESPIRATORY (INHALATION) at 18:59

## 2018-01-01 RX ADMIN — FUROSEMIDE 40 MG: 10 INJECTION, SOLUTION INTRAMUSCULAR; INTRAVENOUS at 22:08

## 2018-01-01 RX ADMIN — ENOXAPARIN SODIUM 60 MG: 60 INJECTION SUBCUTANEOUS at 09:14

## 2018-01-01 RX ADMIN — VANCOMYCIN HYDROCHLORIDE 1000 MG: 1 INJECTION, SOLUTION INTRAVENOUS at 21:48

## 2018-01-01 RX ADMIN — TAZOBACTAM SODIUM AND PIPERACILLIN SODIUM 3.38 G: 375; 3 INJECTION, SOLUTION INTRAVENOUS at 20:40

## 2018-01-01 RX ADMIN — IPRATROPIUM BROMIDE AND ALBUTEROL SULFATE 1 AMPULE: .5; 3 SOLUTION RESPIRATORY (INHALATION) at 10:21

## 2018-01-01 RX ADMIN — POTASSIUM CHLORIDE: 2 INJECTION, SOLUTION, CONCENTRATE INTRAVENOUS at 16:01

## 2018-01-01 RX ADMIN — FUROSEMIDE 40 MG: 40 TABLET ORAL at 07:59

## 2018-01-01 RX ADMIN — MICONAZOLE NITRATE: 20 POWDER TOPICAL at 14:27

## 2018-01-01 RX ADMIN — ATROPINE SULFATE 2 DROP: 10 SOLUTION/ DROPS OPHTHALMIC at 04:16

## 2018-01-01 RX ADMIN — DILTIAZEM HYDROCHLORIDE 30 MG: 30 TABLET, FILM COATED ORAL at 13:24

## 2018-01-01 RX ADMIN — IPRATROPIUM BROMIDE AND ALBUTEROL SULFATE 1 AMPULE: 2.5; .5 SOLUTION RESPIRATORY (INHALATION) at 07:02

## 2018-01-01 RX ADMIN — IPRATROPIUM BROMIDE AND ALBUTEROL SULFATE 1 AMPULE: .5; 3 SOLUTION RESPIRATORY (INHALATION) at 18:39

## 2018-01-01 RX ADMIN — LEVOFLOXACIN 750 MG: 5 INJECTION, SOLUTION INTRAVENOUS at 21:09

## 2018-01-01 RX ADMIN — AZITHROMYCIN MONOHYDRATE 500 MG: 500 INJECTION, POWDER, LYOPHILIZED, FOR SOLUTION INTRAVENOUS at 19:26

## 2018-01-01 RX ADMIN — IPRATROPIUM BROMIDE AND ALBUTEROL SULFATE 1 AMPULE: .5; 3 SOLUTION RESPIRATORY (INHALATION) at 06:28

## 2018-01-01 RX ADMIN — AZITHROMYCIN MONOHYDRATE 500 MG: 500 INJECTION, POWDER, LYOPHILIZED, FOR SOLUTION INTRAVENOUS at 18:07

## 2018-01-01 RX ADMIN — FUROSEMIDE 40 MG: 10 INJECTION, SOLUTION INTRAMUSCULAR; INTRAVENOUS at 15:48

## 2018-01-01 RX ADMIN — Medication 15 UNITS: at 22:18

## 2018-01-01 RX ADMIN — DILTIAZEM HYDROCHLORIDE 30 MG: 30 TABLET, FILM COATED ORAL at 23:33

## 2018-01-01 RX ADMIN — IPRATROPIUM BROMIDE AND ALBUTEROL SULFATE 1 AMPULE: .5; 3 SOLUTION RESPIRATORY (INHALATION) at 06:34

## 2018-01-01 RX ADMIN — MEROPENEM 1 G: 1 INJECTION, POWDER, FOR SOLUTION INTRAVENOUS at 05:49

## 2018-01-01 RX ADMIN — IPRATROPIUM BROMIDE AND ALBUTEROL SULFATE 1 AMPULE: 2.5; .5 SOLUTION RESPIRATORY (INHALATION) at 18:28

## 2018-01-01 RX ADMIN — IPRATROPIUM BROMIDE AND ALBUTEROL SULFATE 1 AMPULE: 2.5; .5 SOLUTION RESPIRATORY (INHALATION) at 19:15

## 2018-01-01 RX ADMIN — LORAZEPAM 2 MG: 2 INJECTION INTRAMUSCULAR; INTRAVENOUS at 21:11

## 2018-01-01 RX ADMIN — MEROPENEM 1 G: 1 INJECTION, POWDER, FOR SOLUTION INTRAVENOUS at 18:42

## 2018-01-01 RX ADMIN — ENOXAPARIN SODIUM 60 MG: 60 INJECTION SUBCUTANEOUS at 08:57

## 2018-01-01 RX ADMIN — ATROPINE SULFATE 2 DROP: 10 SOLUTION/ DROPS OPHTHALMIC at 09:04

## 2018-01-01 RX ADMIN — DILTIAZEM HYDROCHLORIDE 30 MG: 30 TABLET, FILM COATED ORAL at 05:37

## 2018-01-01 RX ADMIN — ENOXAPARIN SODIUM 60 MG: 60 INJECTION SUBCUTANEOUS at 22:39

## 2018-01-01 RX ADMIN — POTASSIUM CHLORIDE AND DEXTROSE MONOHYDRATE 50 ML/HR: 150; 5 INJECTION, SOLUTION INTRAVENOUS at 00:46

## 2018-01-01 RX ADMIN — ACETYLCYSTEINE 600 MG: 200 SOLUTION ORAL; RESPIRATORY (INHALATION) at 06:40

## 2018-01-01 RX ADMIN — ENOXAPARIN SODIUM 60 MG: 60 INJECTION SUBCUTANEOUS at 21:32

## 2018-01-01 RX ADMIN — POTASSIUM CHLORIDE AND DEXTROSE MONOHYDRATE 100 ML/HR: 150; 5 INJECTION, SOLUTION INTRAVENOUS at 12:06

## 2018-01-01 RX ADMIN — NOREPINEPHRINE BITARTRATE 10 MCG/MIN: 1 INJECTION INTRAVENOUS at 19:43

## 2018-01-01 RX ADMIN — PROPOFOL 5 MCG/KG/MIN: 10 INJECTION, EMULSION INTRAVENOUS at 03:10

## 2018-01-01 RX ADMIN — PROPRANOLOL HYDROCHLORIDE 40 MG: 40 TABLET ORAL at 17:24

## 2018-01-01 RX ADMIN — IPRATROPIUM BROMIDE AND ALBUTEROL SULFATE 1 AMPULE: 2.5; .5 SOLUTION RESPIRATORY (INHALATION) at 06:07

## 2018-01-01 RX ADMIN — INSULIN LISPRO 2 UNITS: 100 INJECTION, SOLUTION INTRAVENOUS; SUBCUTANEOUS at 11:17

## 2018-01-01 RX ADMIN — TAZOBACTAM SODIUM AND PIPERACILLIN SODIUM 3.38 G: 375; 3 INJECTION, SOLUTION INTRAVENOUS at 00:00

## 2018-01-01 RX ADMIN — POTASSIUM CHLORIDE AND DEXTROSE MONOHYDRATE 100 ML/HR: 150; 5 INJECTION, SOLUTION INTRAVENOUS at 11:42

## 2018-01-01 RX ADMIN — Medication 2 MG: at 05:45

## 2018-01-01 RX ADMIN — SODIUM CHLORIDE 500 ML: 9 INJECTION, SOLUTION INTRAVENOUS at 06:26

## 2018-01-01 RX ADMIN — ENOXAPARIN SODIUM 60 MG: 60 INJECTION SUBCUTANEOUS at 09:37

## 2018-01-01 RX ADMIN — IPRATROPIUM BROMIDE AND ALBUTEROL SULFATE 1 AMPULE: 2.5; .5 SOLUTION RESPIRATORY (INHALATION) at 11:01

## 2018-01-01 RX ADMIN — ACETYLCYSTEINE 600 MG: 200 SOLUTION ORAL; RESPIRATORY (INHALATION) at 14:29

## 2018-01-01 RX ADMIN — MORPHINE SULFATE 1 MG/HR: 10 INJECTION INTRAVENOUS at 17:33

## 2018-01-01 RX ADMIN — INSULIN LISPRO 1 UNITS: 100 INJECTION, SOLUTION INTRAVENOUS; SUBCUTANEOUS at 21:03

## 2018-01-01 RX ADMIN — Medication 10 ML: at 21:16

## 2018-01-01 RX ADMIN — FUROSEMIDE 40 MG: 10 INJECTION, SOLUTION INTRAMUSCULAR; INTRAVENOUS at 08:58

## 2018-01-01 RX ADMIN — IPRATROPIUM BROMIDE AND ALBUTEROL SULFATE 1 AMPULE: 2.5; .5 SOLUTION RESPIRATORY (INHALATION) at 14:23

## 2018-01-01 RX ADMIN — IPRATROPIUM BROMIDE AND ALBUTEROL SULFATE 1 AMPULE: 2.5; .5 SOLUTION RESPIRATORY (INHALATION) at 06:41

## 2018-01-01 RX ADMIN — SODIUM CHLORIDE 200 ML: 9 INJECTION, SOLUTION INTRAVENOUS at 02:26

## 2018-01-01 RX ADMIN — FAMOTIDINE 20 MG: 10 INJECTION, SOLUTION INTRAVENOUS at 20:40

## 2018-01-01 RX ADMIN — FUROSEMIDE 40 MG: 10 INJECTION, SOLUTION INTRAMUSCULAR; INTRAVENOUS at 08:08

## 2018-01-01 RX ADMIN — VANCOMYCIN HYDROCHLORIDE 1000 MG: 1 INJECTION, SOLUTION INTRAVENOUS at 15:00

## 2018-01-01 RX ADMIN — INSULIN LISPRO 2 UNITS: 100 INJECTION, SOLUTION INTRAVENOUS; SUBCUTANEOUS at 07:58

## 2018-01-01 RX ADMIN — DILTIAZEM HYDROCHLORIDE 30 MG: 30 TABLET, FILM COATED ORAL at 17:40

## 2018-01-01 RX ADMIN — DILTIAZEM HYDROCHLORIDE 120 MG: 120 CAPSULE, COATED, EXTENDED RELEASE ORAL at 12:15

## 2018-01-01 RX ADMIN — PRAVASTATIN SODIUM 40 MG: 20 TABLET ORAL at 08:41

## 2018-01-01 RX ADMIN — DILTIAZEM HYDROCHLORIDE 30 MG: 30 TABLET, FILM COATED ORAL at 00:13

## 2018-01-01 RX ADMIN — MICONAZOLE NITRATE: 20 POWDER TOPICAL at 00:06

## 2018-01-01 RX ADMIN — ACETAMINOPHEN 650 MG: 325 TABLET ORAL at 05:17

## 2018-01-01 RX ADMIN — IPRATROPIUM BROMIDE AND ALBUTEROL SULFATE 1 AMPULE: 2.5; .5 SOLUTION RESPIRATORY (INHALATION) at 11:13

## 2018-01-01 RX ADMIN — DILTIAZEM HYDROCHLORIDE 30 MG: 30 TABLET, FILM COATED ORAL at 21:32

## 2018-01-01 RX ADMIN — IPRATROPIUM BROMIDE AND ALBUTEROL SULFATE 1 AMPULE: 2.5; .5 SOLUTION RESPIRATORY (INHALATION) at 06:08

## 2018-01-01 RX ADMIN — IPRATROPIUM BROMIDE AND ALBUTEROL SULFATE 3 ML: .5; 3 SOLUTION RESPIRATORY (INHALATION) at 06:41

## 2018-01-01 RX ADMIN — ENOXAPARIN SODIUM 60 MG: 60 INJECTION SUBCUTANEOUS at 08:56

## 2018-01-01 RX ADMIN — INSULIN LISPRO 8 UNITS: 100 INJECTION, SOLUTION INTRAVENOUS; SUBCUTANEOUS at 08:58

## 2018-01-01 RX ADMIN — DILTIAZEM HYDROCHLORIDE 120 MG: 120 CAPSULE, COATED, EXTENDED RELEASE ORAL at 21:46

## 2018-01-01 RX ADMIN — FAMOTIDINE 20 MG: 10 INJECTION, SOLUTION INTRAVENOUS at 08:56

## 2018-01-01 RX ADMIN — ACETYLCYSTEINE 800 MG: 200 SOLUTION ORAL; RESPIRATORY (INHALATION) at 18:23

## 2018-01-01 RX ADMIN — ENOXAPARIN SODIUM 60 MG: 60 INJECTION SUBCUTANEOUS at 09:21

## 2018-01-01 RX ADMIN — INSULIN LISPRO 1 UNITS: 100 INJECTION, SOLUTION INTRAVENOUS; SUBCUTANEOUS at 21:44

## 2018-01-01 RX ADMIN — IPRATROPIUM BROMIDE AND ALBUTEROL SULFATE 1 AMPULE: 2.5; .5 SOLUTION RESPIRATORY (INHALATION) at 14:43

## 2018-01-01 RX ADMIN — PROPRANOLOL HYDROCHLORIDE 40 MG: 40 TABLET ORAL at 21:43

## 2018-01-01 RX ADMIN — INSULIN LISPRO 2 UNITS: 100 INJECTION, SOLUTION INTRAVENOUS; SUBCUTANEOUS at 21:33

## 2018-01-01 RX ADMIN — ACETYLCYSTEINE 800 MG: 200 SOLUTION ORAL; RESPIRATORY (INHALATION) at 06:59

## 2018-01-01 RX ADMIN — TAZOBACTAM SODIUM AND PIPERACILLIN SODIUM 3.38 G: 375; 3 INJECTION, SOLUTION INTRAVENOUS at 13:43

## 2018-01-01 RX ADMIN — ACETYLCYSTEINE 600 MG: 200 SOLUTION ORAL; RESPIRATORY (INHALATION) at 07:14

## 2018-01-01 RX ADMIN — POTASSIUM CHLORIDE 10 MEQ: 7.46 INJECTION, SOLUTION INTRAVENOUS at 18:51

## 2018-01-01 RX ADMIN — ENOXAPARIN SODIUM 60 MG: 60 INJECTION SUBCUTANEOUS at 21:48

## 2018-01-01 RX ADMIN — IPRATROPIUM BROMIDE AND ALBUTEROL SULFATE 1 AMPULE: 2.5; .5 SOLUTION RESPIRATORY (INHALATION) at 18:44

## 2018-01-01 RX ADMIN — LIDOCAINE HYDROCHLORIDE 5 ML: 10 INJECTION, SOLUTION EPIDURAL; INFILTRATION; INTRACAUDAL; PERINEURAL at 12:14

## 2018-01-01 RX ADMIN — PROPOFOL 5 MCG/KG/MIN: 10 INJECTION, EMULSION INTRAVENOUS at 01:44

## 2018-01-01 RX ADMIN — PRAVASTATIN SODIUM 40 MG: 20 TABLET ORAL at 08:00

## 2018-01-01 RX ADMIN — DILTIAZEM HYDROCHLORIDE 10 MG/HR: 5 INJECTION INTRAVENOUS at 18:12

## 2018-01-01 RX ADMIN — DILTIAZEM HYDROCHLORIDE 30 MG: 30 TABLET, FILM COATED ORAL at 04:58

## 2018-01-01 RX ADMIN — ACETYLCYSTEINE 600 MG: 200 SOLUTION ORAL; RESPIRATORY (INHALATION) at 19:51

## 2018-01-01 RX ADMIN — MEROPENEM 1 G: 1 INJECTION, POWDER, FOR SOLUTION INTRAVENOUS at 17:15

## 2018-01-01 RX ADMIN — IPRATROPIUM BROMIDE AND ALBUTEROL SULFATE 1 AMPULE: .5; 3 SOLUTION RESPIRATORY (INHALATION) at 13:01

## 2018-01-01 RX ADMIN — INSULIN LISPRO 3 UNITS: 100 INJECTION, SOLUTION INTRAVENOUS; SUBCUTANEOUS at 12:09

## 2018-01-01 RX ADMIN — MEROPENEM 500 MG: 500 INJECTION, POWDER, FOR SOLUTION INTRAVENOUS at 18:12

## 2018-01-01 RX ADMIN — TAZOBACTAM SODIUM AND PIPERACILLIN SODIUM 3.38 G: 375; 3 INJECTION, SOLUTION INTRAVENOUS at 17:24

## 2018-01-01 RX ADMIN — PRAVASTATIN SODIUM 40 MG: 20 TABLET ORAL at 12:37

## 2018-01-01 RX ADMIN — IPRATROPIUM BROMIDE AND ALBUTEROL SULFATE 1 AMPULE: .5; 3 SOLUTION RESPIRATORY (INHALATION) at 18:13

## 2018-01-01 RX ADMIN — INSULIN HUMAN 10 UNITS: 100 INJECTION, SOLUTION PARENTERAL at 09:21

## 2018-01-01 RX ADMIN — ACETYLCYSTEINE 600 MG: 200 SOLUTION ORAL; RESPIRATORY (INHALATION) at 06:39

## 2018-01-01 RX ADMIN — IPRATROPIUM BROMIDE AND ALBUTEROL SULFATE 1 AMPULE: 2.5; .5 SOLUTION RESPIRATORY (INHALATION) at 13:58

## 2018-01-01 RX ADMIN — PIOGLITAZONE 30 MG: 30 TABLET ORAL at 06:19

## 2018-01-01 RX ADMIN — ACETYLCYSTEINE 600 MG: 200 SOLUTION ORAL; RESPIRATORY (INHALATION) at 06:42

## 2018-01-01 RX ADMIN — MEROPENEM 1 G: 1 INJECTION, POWDER, FOR SOLUTION INTRAVENOUS at 06:25

## 2018-01-01 RX ADMIN — TAZOBACTAM SODIUM AND PIPERACILLIN SODIUM 3.38 G: 375; 3 INJECTION, SOLUTION INTRAVENOUS at 08:36

## 2018-01-01 RX ADMIN — INSULIN LISPRO 1 UNITS: 100 INJECTION, SOLUTION INTRAVENOUS; SUBCUTANEOUS at 09:16

## 2018-01-01 RX ADMIN — ENOXAPARIN SODIUM 60 MG: 60 INJECTION SUBCUTANEOUS at 08:16

## 2018-01-01 RX ADMIN — TAZOBACTAM SODIUM AND PIPERACILLIN SODIUM 3.38 G: 375; 3 INJECTION, SOLUTION INTRAVENOUS at 00:54

## 2018-01-01 RX ADMIN — Medication 15 G: at 05:46

## 2018-01-01 RX ADMIN — ETOMIDATE 20 MG: 20 INJECTION, SOLUTION INTRAVENOUS at 01:35

## 2018-01-01 RX ADMIN — Medication 15 UNITS: at 20:40

## 2018-01-01 RX ADMIN — INSULIN LISPRO 2 UNITS: 100 INJECTION, SOLUTION INTRAVENOUS; SUBCUTANEOUS at 17:40

## 2018-01-01 RX ADMIN — DILTIAZEM HYDROCHLORIDE 10 MG/HR: 5 INJECTION INTRAVENOUS at 15:33

## 2018-01-01 RX ADMIN — TAZOBACTAM SODIUM AND PIPERACILLIN SODIUM 3.38 G: 375; 3 INJECTION, SOLUTION INTRAVENOUS at 08:22

## 2018-01-01 RX ADMIN — RISPERIDONE 0.25 MG: 0.5 TABLET, FILM COATED ORAL at 13:08

## 2018-01-01 RX ADMIN — IPRATROPIUM BROMIDE AND ALBUTEROL SULFATE 1 AMPULE: 2.5; .5 SOLUTION RESPIRATORY (INHALATION) at 14:45

## 2018-01-01 RX ADMIN — MEROPENEM 1 G: 1 INJECTION, POWDER, FOR SOLUTION INTRAVENOUS at 17:49

## 2018-01-01 RX ADMIN — DILTIAZEM HYDROCHLORIDE 10 MG: 5 INJECTION INTRAVENOUS at 18:12

## 2018-01-01 RX ADMIN — TAZOBACTAM SODIUM AND PIPERACILLIN SODIUM 3.38 G: 375; 3 INJECTION, SOLUTION INTRAVENOUS at 04:31

## 2018-01-01 RX ADMIN — IPRATROPIUM BROMIDE AND ALBUTEROL SULFATE 1 AMPULE: 2.5; .5 SOLUTION RESPIRATORY (INHALATION) at 06:01

## 2018-01-01 RX ADMIN — TAZOBACTAM SODIUM AND PIPERACILLIN SODIUM 3.38 G: 375; 3 INJECTION, SOLUTION INTRAVENOUS at 21:43

## 2018-01-01 RX ADMIN — GLIMEPIRIDE 1.5 MG: 1 TABLET ORAL at 16:11

## 2018-01-01 RX ADMIN — ACETYLCYSTEINE 600 MG: 200 SOLUTION ORAL; RESPIRATORY (INHALATION) at 18:29

## 2018-01-01 RX ADMIN — INSULIN LISPRO 3 UNITS: 100 INJECTION, SOLUTION INTRAVENOUS; SUBCUTANEOUS at 20:41

## 2018-01-01 RX ADMIN — Medication 15 UNITS: at 21:49

## 2018-01-01 RX ADMIN — FUROSEMIDE 40 MG: 10 INJECTION, SOLUTION INTRAMUSCULAR; INTRAVENOUS at 04:30

## 2018-01-01 RX ADMIN — DILTIAZEM HYDROCHLORIDE 10 MG/HR: 5 INJECTION INTRAVENOUS at 01:11

## 2018-01-01 RX ADMIN — IPRATROPIUM BROMIDE AND ALBUTEROL SULFATE 1 AMPULE: 2.5; .5 SOLUTION RESPIRATORY (INHALATION) at 10:51

## 2018-01-01 RX ADMIN — TAZOBACTAM SODIUM AND PIPERACILLIN SODIUM 3.38 G: 375; 3 INJECTION, SOLUTION INTRAVENOUS at 08:57

## 2018-01-01 RX ADMIN — IPRATROPIUM BROMIDE AND ALBUTEROL SULFATE 1 AMPULE: 2.5; .5 SOLUTION RESPIRATORY (INHALATION) at 14:02

## 2018-01-01 RX ADMIN — GUAIFENESIN 1200 MG: 600 TABLET, EXTENDED RELEASE ORAL at 07:59

## 2018-01-01 RX ADMIN — ATROPINE SULFATE 2 DROP: 10 SOLUTION/ DROPS OPHTHALMIC at 00:15

## 2018-01-01 RX ADMIN — PROPRANOLOL HYDROCHLORIDE 40 MG: 40 TABLET ORAL at 17:39

## 2018-01-01 RX ADMIN — FUROSEMIDE 40 MG: 10 INJECTION, SOLUTION INTRAMUSCULAR; INTRAVENOUS at 08:56

## 2018-01-01 RX ADMIN — INSULIN LISPRO 6 UNITS: 100 INJECTION, SOLUTION INTRAVENOUS; SUBCUTANEOUS at 07:41

## 2018-01-01 RX ADMIN — ACETYLCYSTEINE 600 MG: 200 SOLUTION ORAL; RESPIRATORY (INHALATION) at 19:05

## 2018-01-01 RX ADMIN — IPRATROPIUM BROMIDE AND ALBUTEROL SULFATE 1 AMPULE: 2.5; .5 SOLUTION RESPIRATORY (INHALATION) at 10:03

## 2018-01-01 RX ADMIN — TAZOBACTAM SODIUM AND PIPERACILLIN SODIUM 3.38 G: 375; 3 INJECTION, SOLUTION INTRAVENOUS at 09:15

## 2018-01-01 RX ADMIN — IPRATROPIUM BROMIDE AND ALBUTEROL SULFATE 1 AMPULE: 2.5; .5 SOLUTION RESPIRATORY (INHALATION) at 14:36

## 2018-01-01 RX ADMIN — RISPERIDONE 0.25 MG: 0.5 TABLET, FILM COATED ORAL at 08:40

## 2018-01-01 RX ADMIN — ACETYLCYSTEINE 600 MG: 200 SOLUTION ORAL; RESPIRATORY (INHALATION) at 18:44

## 2018-01-01 RX ADMIN — TAZOBACTAM SODIUM AND PIPERACILLIN SODIUM 3.38 G: 375; 3 INJECTION, SOLUTION INTRAVENOUS at 17:41

## 2018-01-01 RX ADMIN — DILTIAZEM HYDROCHLORIDE 30 MG: 30 TABLET, FILM COATED ORAL at 11:37

## 2018-01-01 RX ADMIN — MICONAZOLE NITRATE: 20 POWDER TOPICAL at 21:48

## 2018-01-01 RX ADMIN — LEVOFLOXACIN 750 MG: 5 INJECTION, SOLUTION INTRAVENOUS at 20:56

## 2018-01-01 RX ADMIN — DILTIAZEM HYDROCHLORIDE 30 MG: 30 TABLET, FILM COATED ORAL at 11:49

## 2018-01-01 RX ADMIN — DILTIAZEM HYDROCHLORIDE 30 MG: 30 TABLET, FILM COATED ORAL at 18:47

## 2018-01-01 RX ADMIN — IPRATROPIUM BROMIDE AND ALBUTEROL SULFATE 1 AMPULE: 2.5; .5 SOLUTION RESPIRATORY (INHALATION) at 06:30

## 2018-01-01 RX ADMIN — IPRATROPIUM BROMIDE AND ALBUTEROL SULFATE 1 AMPULE: 2.5; .5 SOLUTION RESPIRATORY (INHALATION) at 18:32

## 2018-01-01 RX ADMIN — TAZOBACTAM SODIUM AND PIPERACILLIN SODIUM 3.38 G: 375; 3 INJECTION, SOLUTION INTRAVENOUS at 05:43

## 2018-01-01 RX ADMIN — LEVOFLOXACIN 500 MG: 5 INJECTION, SOLUTION INTRAVENOUS at 04:28

## 2018-01-01 RX ADMIN — PROPRANOLOL HYDROCHLORIDE 40 MG: 40 TABLET ORAL at 08:00

## 2018-01-01 RX ADMIN — IPRATROPIUM BROMIDE AND ALBUTEROL SULFATE 1 AMPULE: .5; 3 SOLUTION RESPIRATORY (INHALATION) at 10:20

## 2018-01-01 RX ADMIN — ACETYLCYSTEINE 600 MG: 200 SOLUTION ORAL; RESPIRATORY (INHALATION) at 19:15

## 2018-01-01 RX ADMIN — ENOXAPARIN SODIUM 40 MG: 40 INJECTION SUBCUTANEOUS at 08:56

## 2018-01-01 RX ADMIN — SODIUM CHLORIDE 500 ML: 9 INJECTION, SOLUTION INTRAVENOUS at 12:08

## 2018-01-01 RX ADMIN — INSULIN LISPRO 2 UNITS: 100 INJECTION, SOLUTION INTRAVENOUS; SUBCUTANEOUS at 18:18

## 2018-01-01 RX ADMIN — LORAZEPAM 2 MG: 2 INJECTION INTRAMUSCULAR; INTRAVENOUS at 13:40

## 2018-01-01 RX ADMIN — AZITHROMYCIN MONOHYDRATE 500 MG: 500 INJECTION, POWDER, LYOPHILIZED, FOR SOLUTION INTRAVENOUS at 18:15

## 2018-01-01 RX ADMIN — INSULIN LISPRO 6 UNITS: 100 INJECTION, SOLUTION INTRAVENOUS; SUBCUTANEOUS at 05:53

## 2018-01-01 RX ADMIN — ACETYLCYSTEINE: 200 SOLUTION ORAL; RESPIRATORY (INHALATION) at 18:56

## 2018-01-01 RX ADMIN — IPRATROPIUM BROMIDE AND ALBUTEROL SULFATE 1 AMPULE: 2.5; .5 SOLUTION RESPIRATORY (INHALATION) at 11:43

## 2018-01-01 RX ADMIN — AZITHROMYCIN MONOHYDRATE 500 MG: 500 INJECTION, POWDER, LYOPHILIZED, FOR SOLUTION INTRAVENOUS at 18:31

## 2018-01-01 RX ADMIN — MEROPENEM 1 G: 1 INJECTION, POWDER, FOR SOLUTION INTRAVENOUS at 04:58

## 2018-01-01 RX ADMIN — FUROSEMIDE 40 MG: 10 INJECTION, SOLUTION INTRAMUSCULAR; INTRAVENOUS at 13:51

## 2018-01-01 RX ADMIN — DILTIAZEM HYDROCHLORIDE 30 MG: 30 TABLET, FILM COATED ORAL at 05:49

## 2018-01-01 RX ADMIN — AZITHROMYCIN MONOHYDRATE 500 MG: 500 INJECTION, POWDER, LYOPHILIZED, FOR SOLUTION INTRAVENOUS at 18:42

## 2018-01-01 RX ADMIN — INSULIN LISPRO 4 UNITS: 100 INJECTION, SOLUTION INTRAVENOUS; SUBCUTANEOUS at 09:50

## 2018-01-01 RX ADMIN — TAZOBACTAM SODIUM AND PIPERACILLIN SODIUM 3.38 G: 375; 3 INJECTION, SOLUTION INTRAVENOUS at 07:31

## 2018-01-01 RX ADMIN — DILTIAZEM HYDROCHLORIDE 30 MG: 30 TABLET, FILM COATED ORAL at 18:02

## 2018-01-01 RX ADMIN — ACETYLCYSTEINE 600 MG: 200 SOLUTION ORAL; RESPIRATORY (INHALATION) at 18:13

## 2018-01-01 RX ADMIN — INSULIN LISPRO 6 UNITS: 100 INJECTION, SOLUTION INTRAVENOUS; SUBCUTANEOUS at 17:40

## 2018-01-01 RX ADMIN — INSULIN LISPRO 4 UNITS: 100 INJECTION, SOLUTION INTRAVENOUS; SUBCUTANEOUS at 11:40

## 2018-01-01 RX ADMIN — MEROPENEM 1 G: 1 INJECTION, POWDER, FOR SOLUTION INTRAVENOUS at 18:25

## 2018-01-01 RX ADMIN — Medication 10 ML: at 13:40

## 2018-01-01 RX ADMIN — MICONAZOLE NITRATE: 20 POWDER TOPICAL at 21:28

## 2018-01-01 RX ADMIN — INSULIN LISPRO 2 UNITS: 100 INJECTION, SOLUTION INTRAVENOUS; SUBCUTANEOUS at 22:30

## 2018-01-01 RX ADMIN — POTASSIUM CHLORIDE 10 MEQ: 7.46 INJECTION, SOLUTION INTRAVENOUS at 15:31

## 2018-01-01 RX ADMIN — ENOXAPARIN SODIUM 60 MG: 60 INJECTION SUBCUTANEOUS at 20:42

## 2018-01-01 RX ADMIN — DILTIAZEM HYDROCHLORIDE 5 MG/HR: 5 INJECTION INTRAVENOUS at 03:35

## 2018-01-01 RX ADMIN — INSULIN LISPRO 1 UNITS: 100 INJECTION, SOLUTION INTRAVENOUS; SUBCUTANEOUS at 08:57

## 2018-01-01 RX ADMIN — ENOXAPARIN SODIUM 60 MG: 60 INJECTION SUBCUTANEOUS at 20:56

## 2018-01-01 RX ADMIN — TAZOBACTAM SODIUM AND PIPERACILLIN SODIUM 3.38 G: 375; 3 INJECTION, SOLUTION INTRAVENOUS at 15:49

## 2018-01-01 RX ADMIN — MEROPENEM 1 G: 1 INJECTION, POWDER, FOR SOLUTION INTRAVENOUS at 05:37

## 2018-01-01 RX ADMIN — MEROPENEM 1 G: 1 INJECTION, POWDER, FOR SOLUTION INTRAVENOUS at 05:46

## 2018-01-01 RX ADMIN — ACETYLCYSTEINE 800 MG: 200 SOLUTION ORAL; RESPIRATORY (INHALATION) at 06:30

## 2018-01-01 RX ADMIN — FAMOTIDINE 20 MG: 10 INJECTION, SOLUTION INTRAVENOUS at 09:21

## 2018-01-01 RX ADMIN — NOREPINEPHRINE BITARTRATE 4 MCG/MIN: 1 INJECTION INTRAVENOUS at 15:48

## 2018-01-01 RX ADMIN — PROPOFOL 15 MCG/KG/MIN: 10 INJECTION, EMULSION INTRAVENOUS at 04:43

## 2018-01-01 ASSESSMENT — PULMONARY FUNCTION TESTS
PIF_VALUE: 36.9
PIF_VALUE: 36.3
PIF_VALUE: 27.3
PIF_VALUE: 25.7
PIF_VALUE: 35
PIF_VALUE: 39.4
PIF_VALUE: 38.6
PIF_VALUE: 27.3
PIF_VALUE: 35.1
PIF_VALUE: 29.4
PIF_VALUE: 40.8
PIF_VALUE: 34.4
PIF_VALUE: 29.5
PIF_VALUE: 30.1
PIF_VALUE: 39.2
PIF_VALUE: 28.5
PIF_VALUE: 26.9
PIF_VALUE: 24.5
PIF_VALUE: 29.2
PIF_VALUE: 31.7
PIF_VALUE: 28.3
PIF_VALUE: 32.1
PIF_VALUE: 12.6
PIF_VALUE: 33
PIF_VALUE: 33.2
PIF_VALUE: 29.1
PIF_VALUE: 33
PIF_VALUE: 32.7
PIF_VALUE: 28.5
PIF_VALUE: 28.9
PIF_VALUE: 26.4
PIF_VALUE: 32.7
PIF_VALUE: 28.3
PIF_VALUE: 33.8
PIF_VALUE: 31.2
PIF_VALUE: 32.1
PIF_VALUE: 31.8
PIF_VALUE: 30.2
PIF_VALUE: 35
PIF_VALUE: 30.7

## 2018-01-01 ASSESSMENT — PAIN SCALES - GENERAL
PAINLEVEL_OUTOF10: 0
PAINLEVEL_OUTOF10: 2
PAINLEVEL_OUTOF10: 0
PAINLEVEL_OUTOF10: 3
PAINLEVEL_OUTOF10: 0
PAINLEVEL_OUTOF10: 3
PAINLEVEL_OUTOF10: 3
PAINLEVEL_OUTOF10: 0
PAINLEVEL_OUTOF10: 3
PAINLEVEL_OUTOF10: 0
PAINLEVEL_OUTOF10: 3
PAINLEVEL_OUTOF10: 0
PAINLEVEL_OUTOF10: 3
PAINLEVEL_OUTOF10: 0
PAINLEVEL_OUTOF10: 10
PAINLEVEL_OUTOF10: 0
PAINLEVEL_OUTOF10: 0
PAINLEVEL_OUTOF10: 5

## 2018-01-01 ASSESSMENT — ENCOUNTER SYMPTOMS
GASTROINTESTINAL NEGATIVE: 1
EYES NEGATIVE: 1
WHEEZING: 0
SHORTNESS OF BREATH: 1
COUGH: 1
RESPIRATORY NEGATIVE: 1

## 2018-01-01 ASSESSMENT — LIFESTYLE VARIABLES: HISTORY_ALCOHOL_USE: NO

## 2018-01-01 ASSESSMENT — SLEEP AND FATIGUE QUESTIONNAIRES
DO YOU HAVE DIFFICULTY SLEEPING: NO
AVERAGE NUMBER OF SLEEP HOURS: 6
DO YOU USE A SLEEP AID: NO

## 2018-01-01 ASSESSMENT — PAIN SCALES - WONG BAKER: WONGBAKER_NUMERICALRESPONSE: 0

## 2018-09-11 PROBLEM — F29 PSYCHOTIC DISORDER WITH HALLUCINATIONS (HCC): Status: ACTIVE | Noted: 2018-01-01

## 2018-09-12 PROBLEM — J96.01 ACUTE RESPIRATORY FAILURE WITH HYPOXIA (HCC): Status: ACTIVE | Noted: 2018-01-01

## 2018-09-12 NOTE — PROGRESS NOTES
Patient visited by Spiritual Care volunteer MARCELLO Louis. [address] [facility] completed. Prayer/emotional support.

## 2018-09-12 NOTE — PROGRESS NOTES
tolerance monitoring;Oral motor exercises; Pharyngeal exercises; Therapeutic PO trials with SLP    Compensatory Swallowing Strategies  Compensatory Swallowing Strategies: Upright as possible for all oral intake;Assist feed;Small bites/sips;Eat/Feed slowly; Alternate solids and liquids; External pacing;Remain upright for 30-45 minutes after meals    Treatment/Goals  Timeframe for Short-term Goals: 1-2x/day for 3 days  Goal 1: Patient will tolerate dental soft consistency and nectar thick liquids with min S/S penetration/aspiration during PO intake. Goal 2: Patient staff will follow swallow safety recommendations to decrease risk of penetration/aspiration during PO intake. Goal 3: Patient will complete oral motor, lingual, and pharyngeal exercises with provision of mod cues/prompts. Goal 4: Re-assessment of swallow function for potential diet upgrade. General  Chart Reviewed: Yes  Behavior/Cognition: Alert; Cooperative  Communication Observation:  (Weak voicing was noted during verbalizations.)  Follows Directions: Simple  Patient Positioning: Upright in bed  Volitional Cough: Weak;Congested  Volitional Swallow: Delayed  Consistencies Administered: Ice Chips;Puree;Mechanical soft; Honey - cup;Nectar - cup; Thin - cup    Assessed patient's swallowing function with the following observations noted:    Oral Phase Dysfunction  Oral Phase: Exceptions  Oral Phase Dysfunction  Impaired Mastication: Ice chips;Mechanical soft (Patient exhibited slow oral prep with decreased rotary jaw movement noted during single ice chip trials and mechanical soft consistency trials (with added gravy texture) presented by SLP.)  Decreased Anterior to Posterior Transit: Mechanical soft  Suspected Premature Bolus Loss: Ice chips; Thin - cup (Patient exhibited fast oral transit of masticated ice chip trials and thin H2O trials (presented via cup by SLP). )  Lingual/Palatal Residue: Mechanical soft (Min-moderate oral cavity residue was noted

## 2018-09-13 NOTE — PROGRESS NOTES
Psychiatry when he is medically stable. Discharge planning:   Home  Vs  BHI    Reviewed treatment plans with the patient and/or family.              Electronically signed by Rossana Sharp MD on 9/13/2018 at 5:36 PM

## 2018-09-14 NOTE — CONSULTS
diltiazem (CARDIZEM CD) extended release capsule 120 mg  120 mg Oral BID Allison Alcala MD   120 mg at 09/13/18 1742    enoxaparin (LOVENOX) injection 60 mg  1 mg/kg Subcutaneous Daily Allison Alcala MD   60 mg at 09/14/18 0914    acetaminophen (TYLENOL) tablet 650 mg  650 mg Oral Q4H PRN Nolan Schwab MD        magnesium hydroxide (MILK OF MAGNESIA) 400 MG/5ML suspension 30 mL  30 mL Oral Daily PRN Nolan Schwab MD        ipratropium-albuterol (DUONEB) nebulizer solution 1 ampule  1 ampule Inhalation Q4H WA Nolan Schwab MD   1 ampule at 09/14/18 8643    propranolol (INDERAL) tablet 40 mg  40 mg Oral BID Nolan Schwab MD   40 mg at 09/13/18 1741    magnesium hydroxide (MILK OF MAGNESIA) 400 MG/5ML suspension 15 mL  15 mL Oral Daily PRN Jonas Allison MD        piperacillin-tazobactam (ZOSYN) 3.375 g in dextrose 50 mL IVPB extended infusion (premix)  3.375 g Intravenous Alexandru Zimmerman MD 12.5 mL/hr at 09/14/18 0915 3.375 g at 09/14/18 0915    insulin lispro (HUMALOG) injection vial 0-6 Units  0-6 Units Subcutaneous TID WC Jonas Allison MD   1 Units at 09/14/18 0916    insulin lispro (HUMALOG) injection vial 0-3 Units  0-3 Units Subcutaneous Nightly Jonas Allison MD   2 Units at 09/12/18 2230    glucose (GLUTOSE) 40 % oral gel 15 g  15 g Oral PRN Jonas Allison MD        dextrose 50 % solution 12.5 g  12.5 g Intravenous PRN Jonas Allison MD        glucagon (rDNA) injection 1 mg  1 mg Intramuscular PRN Jonas Allison MD        dextrose 5 % solution  100 mL/hr Intravenous PRN Jonas Allison MD           Past Medical History:  Past Medical History:   Diagnosis Date    Asthma     Diabetes mellitus ([address]     Hyperlipidemia     Hypertension     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  No pertinent family

## 2018-09-14 NOTE — PROGRESS NOTES
education;Diet tolerance monitoring;Oral motor exercises; Pharyngeal exercises; Therapeutic PO trials with SLP    Compensatory Swallowing Strategies  Compensatory Swallowing Strategies: Upright as possible for all oral intake;Small bites/sips;Eat/Feed slowly; Alternate solids and liquids; External pacing;Remain upright for 30-45 minutes after meals; Total feed; Liquid by spoon only    Treatment/Goals  Timeframe for Short-term Goals: 1-2x/day for 3 days  Goal 1: Patient will tolerate puree consistency and honey thick liquids with min S/S penetration/aspiration during PO intake. Goal 2: Patient staff will follow swallow safety recommendations to decrease risk of penetration/aspiration during PO intake. Goal 3: Patient will complete oral motor, lingual, and pharyngeal exercises with provision of mod cues/prompts. Goal 4: Re-assessment of swallow function for potential diet upgrade. Goal 5: Potential videofluoroscopic swallow study to objectively determine swallow function. General  Chart Reviewed: Yes  Behavior/Cognition: Lethargic;Cooperative  Communication Observation:  (Weak voicing noted.)  Follows Directions: Simple  Patient Positioning: Upright in bed  Volitional Cough: Weak;Congested (Crackles noted on exhalation. )  Consistencies Administered: Puree; Honey - teaspoon    Observed patient's swallowing function with the following observations noted:    Oral Phase Dysfunction  Oral Phase: Exceptions  Oral Phase Dysfunction  Suspected Premature Bolus Loss: Puree; Honey - teaspoon (Oral transit of puree consistency trials and honey thick H2O trials, presented via spoon by SLP, varied from 1-4 seconds in length.)  Oral Phase - Comment: No puree consistency residue was noted post swallows. Indicators of Pharyngeal Phase Dysfunction  Pharyngeal Phase: Exceptions  Indicators of Pharyngeal Phase Dysfunction  Delayed Swallow: Puree; Honey - teaspoon (Suspect secondary to oral transit times.)  Decreased Laryngeal

## 2018-09-14 NOTE — PROGRESS NOTES
LABVLDL  TSH:    Lab Results   Component Value Date    TSH 2.720 09/11/2018     Troponin T: No results for input(s): TROPONINI in the last 72 hours. INR: No results for input(s): INR in the last 72 hours. No results for input(s): LIPASE in the last 72 hours. -----------------------------------------------------------------  RAD:       Physical Exam:     Vitals:    09/14/18 0208 09/14/18 0444 09/14/18 0635 09/14/18 1330   BP: (!) 84/59 98/67 103/62    Pulse: 112 96 84 96   Resp: 18 18 20    Temp: 98.5 °F (36.9 °C) 98.4 °F (36.9 °C) 96.7 °F (35.9 °C)    TempSrc: Temporal Temporal Temporal    SpO2: 94% 92% 92%    Weight:       Height:         24HR INTAKE/OUTPUT:      Intake/Output Summary (Last 24 hours) at 09/14/18 1740  Last data filed at 09/14/18 1300   Gross per 24 hour   Intake              675 ml   Output             1300 ml   Net             -625 ml     General appearance: alert and cooperative with exam  Lungs: Normal respiratory effort, no wheezes, scattered rhonchi noted. Heart: irregularly irregular rhythm  Abdomen: soft, non-tender; bowel sounds normal; no masses,  no organomegaly  Extremities: extremities normal, atraumatic, no cyanosis or edema  Neurologic: No obvious focal neurologic deficits. Psychiatric:  Appropriate mood and affect. Skin:  Warm and dry. Impression:       1. Oropharyngeal Dysphagia - Pureed diet with honey thickened liquids per SLP. 2.  MIRACLE - Nephrology consulted. Urine studies, renal ultrasound, and IVF's ordered. 3.  Possible aspiration pneumonia - on Zosyn. Pulmonology following. Sputum culture ordered. 4.  Atrial fibrillation - Cardiology following. On Cardizem. Plan:     1. Continue modified diet of puree consistency with honey thickened liquids. 2.  Continue antibiotics, IVF's.   3.  Will discuss with Dr. Victor Manuel Rangel.     Cesar Petersen PA-C  9/14/2018

## 2018-09-15 PROBLEM — J81.1 PULMONARY EDEMA: Status: ACTIVE | Noted: 2018-01-01

## 2018-09-15 NOTE — PROGRESS NOTES
Chief Complaint:  Hypoxemia      Interval History:     He has had worsening shortness of breath. Not much cough or sputum. No chest pain    Review of Systems:   General ROS: no chills or fever  Respiratory ROS: as per HPI  Cardiovascular ROS: no chest pain or dyspnea on exertion  Gastrointestinal ROS: no abdominal pain, diarrhea or nausea/vomiting       Vitals: BP 95/65   Pulse 69   Temp 97.8 °F (36.6 °C) (Temporal)   Resp 26   Ht 5' 6\" (1.676 m)   Wt 135 lb (61.2 kg)   SpO2 (!) 86%   BMI 21.79 kg/m²   24 hour intake/output:  Intake/Output Summary (Last 24 hours) at 09/15/18 1539  Last data filed at 09/15/18 1339   Gross per 24 hour   Intake             1612 ml   Output             1000 ml   Net              612 ml     Last 3 weights:   Wt Readings from Last 3 Encounters:   09/13/18 135 lb (61.2 kg)         Physical Exam:     General Appearance:    Alert, cooperative, in no acute distress  Head:    N/A  Throat:   N/A  Neck:   N/A  Lungs:   Bilateral rales and rhonci  Heart:    Irregular irregular  Abdomen:     Normal bowel sounds, no masses, no organomegaly, soft, non-tender,   non-distended, no guarding, no rebound      Extremities:   No edema, no cyanosis, no clubbing  Pulses:   N/A  Skin:   N/A  Lymph nodes:   N/A  Neurologic:   N/A         Results Review:      Lab:  Recent Results (from the past 24 hour(s))   POCT Glucose    Collection Time: 09/14/18  4:07 PM   Result Value Ref Range    POC Glucose 220 (H) 70 - 99 mg/dl    Performed on AccuChek    Creatinine, Random Urine    Collection Time: 09/14/18  5:40 PM   Result Value Ref Range    Creatinine, Ur 146.3 4.2 - 622.0 mg/dL   Eosinophil smear urine    Collection Time: 09/14/18  5:40 PM   Result Value Ref Range    Eosinophil, Ur NoWBC    Protein, urine, random    Collection Time: 09/14/18  5:40 PM   Result Value Ref Range    Protein, Ur 52 (H) 15 - 45 mg/dL   Sodium, urine, random    Collection Time: 09/14/18  5:40 PM   Result Value Ref Range

## 2018-09-15 NOTE — PLAN OF CARE
Problem: Falls - Risk of:  Goal: Will remain free from falls  Will remain free from falls   Outcome: Ongoing    Goal: Absence of physical injury  Absence of physical injury   Outcome: Ongoing      Problem: Nutrition  Goal: Optimal nutrition therapy  Nutrition Problem: Inadequate oral intake  Intervention: Food and/or Nutrient Delivery: Continue NPO  Nutritional Goals: diet progression or nutrition support        Outcome: Ongoing

## 2018-09-15 NOTE — PROGRESS NOTES
Physical Therapy    Facility/Department: [facility] 4 ONCOLOGY UNIT  Initial Assessment    NAME: Osmin Zaman  : 1933  MRN: [id number]    Date of Service: 9/15/2018    Discharge Recommendations:  Continue to assess pending progress, Patient would benefit from continued therapy after discharge        Patient Diagnosis(es): There were no encounter diagnoses. has a past medical history of Asthma; Diabetes mellitus ([address] Hyperlipidemia; Hypertension; and Psychotic disorder with hallucinations. has a past surgical history that includes Dilatation, esophagus and Gallbladder surgery. Restrictions  Restrictions/Precautions  Restrictions/Precautions: Fall Risk  Vision/Hearing        Subjective  General  Diagnosis: OBSTRUCTIVE UROPATHY, CONFUSION  Subjective  Subjective: WILLING TO PARTICIPATE  Pain Screening  Patient Currently in Pain: No  Vital Signs  Patient Currently in Pain: No  Oxygen Therapy  O2 Device: Nasal cannula  O2 Flow Rate (L/min): 4 L/min       Orientation  Orientation  Overall Orientation Status: Impaired  Orientation Level: Oriented to person;Disoriented to place;Oriented to time    Social/Functional History  Social/Functional History  Lives With: Alone  Additional Comments: QUESTIONABLE HISTORIAN. REPORTS HISTORY OF A FALL  Objective          PROM RLE (degrees)  RLE PROM: WFL  PROM LLE (degrees)  LLE PROM: WFL  Strength RLE  Comment: GROSSLY +3/5  Strength LLE  Comment: GROSSLY +3/5        Bed mobility  Supine to Sit: Moderate assistance  Sit to Supine: Moderate assistance  Transfers  Sit to Stand: Moderate Assistance  Comment: STOOD EOB AND ABLE TO TAKE 4-5 SMALL SIDE STEPS WITH HHA, MIN-MOD. TENDS TO LEAN BACK WITHOUT ANY CUEING  Ambulation  Ambulation?: No     Balance  Sitting - Dynamic: Fair;+  Standing - Dynamic: Poor;+        Assessment   Body structures, Functions, Activity limitations: Decreased functional mobility ; Decreased endurance;Decreased balance;Decreased ROM; Decreased

## 2018-09-16 NOTE — PROGRESS NOTES
Physical Therapy  Name: Fran Leonard  MRN:  [id number]  Date of service:  9/16/2018 09/16/18 1658   General   Missed reason Patient ill   Subjective   Subjective patient very lethargic and difficult to arouse today, his nurse Vipin Santiago) states to let him rest.         Electronically signed by Tyler Leos PTA on 9/16/2018 at 4:59 PM

## 2018-09-16 NOTE — PROGRESS NOTES
Chief Complaint:  Hypoxemia      Interval History:     He did okay overnight on BiPAP. Patient doesn't voice specific concerns this morning. He is awake but not very verbal.    Review of Systems:   Unable to fully obtain  Vitals: /66   Pulse 85   Temp 98.9 °F (37.2 °C) (Temporal)   Resp 24   Ht 5' 6\" (1.676 m)   Wt 135 lb (61.2 kg)   SpO2 95%   BMI 21.79 kg/m²   24 hour intake/output:  Intake/Output Summary (Last 24 hours) at 09/16/18 1127  Last data filed at 09/16/18 0825   Gross per 24 hour   Intake             1429 ml   Output             1570 ml   Net             -141 ml     Last 3 weights: Wt Readings from Last 3 Encounters:   09/13/18 135 lb (61.2 kg)         Physical Exam:     General Appearance:    Awake, not kidney getting much.  Not in distress  Head:    N/A  Throat:   N/A  Neck:   N/A  Lungs:   Bilateral rales and rhonchi, mild tachypnea  Heart:    Irregularly irregular  Abdomen:     Normal bowel sounds, no masses, no organomegaly, soft, non-tender,   non-distended, no guarding, no rebound      Extremities:   No edema, no cyanosis, no clubbing  Pulses:   N/A  Skin:   N/A  Lymph nodes:   N/A  Neurologic:   N/A         Results Review:      Lab:  Recent Results (from the past 24 hour(s))   POCT Glucose    Collection Time: 09/15/18 11:56 AM   Result Value Ref Range    POC Glucose 188 (H) 70 - 99 mg/dl    Performed on AccuChek    Blood Gas, Arterial    Collection Time: 09/15/18  3:32 PM   Result Value Ref Range    pH, Arterial 7.400 7.350 - 7.450    pCO2, Arterial 33.0 (L) 35.0 - 45.0 mmHg    pO2, Arterial 59.0 (L) 80.0 - 100.0 mmHg    HCO3, Arterial 20.4 (L) 22.0 - 26.0 mmol/L    Base Excess, Arterial -3.5 (L) -2.0 - 2.0 mmol/L    Hemoglobin, Art, Extended 15.3 14.0 - 18.0 g/dL    O2 Sat, Arterial 90.4 >92 %    Carboxyhgb, Arterial 1.8 0.0 - 5.0 %    Methemoglobin, Arterial 1.1 <1.5 %    O2 Content, Arterial 19.4 Not Established mL/dL    O2 Therapy Unknown    Potassium, Whole Blood

## 2018-09-16 NOTE — PLAN OF CARE
Problem: Risk for Impaired Skin Integrity  Goal: Tissue integrity - skin and mucous membranes  Structural intactness and normal physiological function of skin and  mucous membranes.    Outcome: Ongoing      Problem: Falls - Risk of:  Goal: Will remain free from falls  Will remain free from falls   Outcome: Ongoing

## 2018-09-17 NOTE — PROGRESS NOTES
Nutrition Assessment    Type and Reason for Visit: Reassess    Nutrition Recommendations: follow for diet progression or nutrition support per SLP recs. Malnutrition Assessment:  · Malnutrition Status: At risk for malnutrition  · Context: Acute illness or injury    Nutrition Diagnosis:   · Problem: Inadequate oral intake  · Etiology: related to Difficulty swallowing     Signs and symptoms:  as evidenced by Intake 0-25%, Swallow study results    Nutrition Assessment:  · Subjective Assessment: Decreased po on pureed diet, breakfast was held per nursing this morning d/t pt sounding wet and gurgly. On BiPap. · Nutrition-Focused Physical Findings: BiPap  · Wound Type: None  · Current Nutrition Therapies:  · Oral Diet Orders: NPO   · Oral Diet intake: NPO  · Oral Nutrition Supplement (ONS) Orders: None  · Anthropometric Measures:  · Ht: 5' 6\" (167.6 cm)   · Current Body Wt:    · Admission Body Wt: 135 lb (61.2 kg)  · Usual Body Wt:    · % Weight Change:  ,     · Ideal Body Wt: 142 lb (64.4 kg), % Ideal Body    · BMI Classification: BMI 18.5 - 24.9 Normal Weight  · Comparative Standards (Estimated Nutrition Needs):  · Estimated Daily Total Kcal: 7362-5459  · Estimated Daily Protein (g): 73-85    Estimated Intake vs Estimated Needs: Intake Less Than Needs    Nutrition Risk Level: High    Nutrition Interventions:   Continue NPO  Continued Inpatient Monitoring    Nutrition Evaluation:   · Evaluation: Progressing toward goals   · Goals: diet progression or nutrition support    · Monitoring: Diet Progression, Weight, Pertinent Labs, Chewing/Swallowing    See Adult Nutrition Doc Flowsheet for more detail.      Electronically signed by Mina Edwards MS, RD, LD on 9/17/18 at 11:08 AM    Contact Number: [phone]

## 2018-09-17 NOTE — PROGRESS NOTES
Patient HR >160. Paged cardiology and Dr. Daquan Farmer. At bedside, unable to obtain a BP and unable to get SatO2. Rapid response called.  Pravin Maxwell RN

## 2018-09-17 NOTE — PROGRESS NOTES
has BiPAP mask in place. HEENT: BiPAP mask is in place. Eyes: Pupils are equal and reactive to light. Neck: No definite JVD noted. Chest: Coarse rales throughout. Cardiac: He has an irregular rhythm and is tachycardic. Abdomen: Soft. Extremities: He has trace edema of the calves. Musculoskeletal: No joint deformities noted. Dermatologic: No rash noted. Neurologic: He is awake but will not respond to questioning. Psychiatric: Cannot assess. Lymphatic: No adenopathy palpated. Recent Labs      09/15/18   0332  09/16/18   0329  09/17/18   0601   WBC  10.1  10.5  12.8*   RBC  4.94  4.66*  4.86   HGB  16.1  15.5  16.2   HCT  49.4  46.4  48.5   PLT  186  175  186   MCV  100.0*  99.6*  99.8*   MCH  32.6*  33.3*  33.3*   MCHC  32.6*  33.4  33.4   RDW  13.2  13.3  13.2   BANDSPCT  8*  1  18*      Recent Labs      09/15/18   0332  09/15/18   1532  09/16/18   0329  09/17/18   0601   NA  148*   --   150*  155*   K  3.9  4.1  3.4*  3.4*   CL  109   --   112*  112*   CO2  22   --   26  29   BUN  84*   --   85*  75*   CREATININE  1.7*   --   1.4*  1.2   CALCIUM  8.8   --   8.6*  8.7*   GLUCOSE  181*   --   168*  266*   PHART   --   7.400   --    --    [id number]   --   33.0*   --    --    PO2ART   --   59.0*   --    --    [id number]   --   20.4*   --    --    [id number]   --   90.4   --    --    BEART   --   -3.5*   --    --    AST  29   --   30  32   ALT  25   --   28  29   ALKPHOS  43   --   45  59   BILITOT  2.1*   --   1.9*  1.6*   MG  2.7*   --    --    --    PHOS  2.9   --    --    --       No results for input(s): BC, LABGRAM, CULTRESP, BFCX in the last 72 hours. Radiograph:  EXAM: XR CHEST PORTABLE -- 9/16/2018 10:30 AM   HISTORY: 85 years, Male, hypoxemia   COMPARISON: 9/15/2018   TECHNIQUE:  1 images.  Frontal view of the chest.   FINDINGS:     No pneumothorax. Similar right pulmonary opacity. Increased left   pulmonary opacity. Similar nonenlarged cardiac and mediastinal   silhouette.  No acute bony findings.     Impression   1. Increased left pulmonary opacity and similar right pulmonary   opacity. This could represent infection or edema and has been   gradually increasing over the past few days. Signed by Dr Kameron Vyas on 9/16/2018 4:17 PM       My radiograph interpretation: Worsening pulmonary edema. Pulmonary Assessment:    1. Acute hypoxemic respiratory failure due to pulmonary edema. He had an echocardiogram showing good LV function so this probably relates to volume overload and possibly some diastolic dysfunction and again he has had problems with atrial fibrillation as well. He also could have some noncardiogenic pulmonary related to a previous episode of aspiration. Recommend:   · He has been on broad-spectrum antibiotics and is afebrile so I don't think his current chest x-ray picture represents an active pneumonic process but again is more likely due to pulmonary edema and I recommend diuresing the patient. BiPAP can certainly be continued. If he worsens despite this however he would be a very poor candidate for intubation and mechanical ventilatory support and I would not recommend proceeding with same if his condition deteriorates and would recommend end-of-life discussions be held with the family in this regard.     Electronically signed by Zeeshan Mane on 9/17/2018 at 12:04 PM

## 2018-09-17 NOTE — PROGRESS NOTES
GI  - PROGRESS NOTE    Subjective:   Admit Date: 9/11/2018  PCP: No primary care provider on file. Patient being seen for: Dysphagia    24hr events/Interval History:   Patient had worsening hypoxia over the weekend and now requiring BiPAP. Confused. He is not eating, unable to tolerate oral intake currently. DIET CARB CONTROL; Carb Control: 4 carb choices (60 gms)/meal; Dysphagia I Pureed;  Honey Thick     Tolerated                  Pain:None  Nausea:None      Medications:  Scheduled Meds:   enoxaparin  1 mg/kg Subcutaneous BID    furosemide  40 mg Intravenous Daily    piperacillin-tazobactam  3.375 g Intravenous Q8H    ipratropium-albuterol  1 ampule Inhalation 4x daily    levofloxacin  750 mg Intravenous Q48H    acetylcysteine  600 mg Inhalation BID    diltiazem  120 mg Oral BID    propranolol  40 mg Oral BID    insulin lispro  0-6 Units Subcutaneous TID WC    insulin lispro  0-3 Units Subcutaneous Nightly       Continuous Infusions:   dextrose 5 % with KCl 20 mEq 100 mL/hr (09/17/18 1206)    dextrose         PRN Meds:.acetaminophen, magnesium hydroxide, glucose, dextrose, glucagon (rDNA), dextrose      Labs:     Recent Labs      09/15/18   0332 09/16/18   0329 09/17/18   0601   WBC  10.1  10.5  12.8*   RBC  4.94  4.66*  4.86   HGB  16.1  15.5  16.2   HCT  49.4  46.4  48.5   MCV  100.0*  99.6*  99.8*   MCH  32.6*  33.3*  33.3*   MCHC  32.6*  33.4  33.4   PLT  186  175  186     Recent Labs      09/15/18   0332  09/15/18   1532  09/16/18   0329  09/17/18   0601   NA  148*   --   150*  155*   K  3.9  4.1  3.4*  3.4*   ANIONGAP  17   --   12  14   CL  109   --   112*  112*   CO2  22   --   26  29   BUN  84*   --   85*  75*   CREATININE  1.7*   --   1.4*  1.2   GLUCOSE  181*   --   168*  266*   CALCIUM  8.8   --   8.6*  8.7*     Recent Labs      09/15/18   0332   MG  2.7*   PHOS  2.9     Recent Labs      09/15/18   0332  09/16/18   0329  09/17/18 0601   AST  29  30  32   ALT  25  28  29   BILITOT  2.1*  1.9*  1.6*   ALKPHOS  43  45  59     HgBA1c:  No components found for: HGBA1C  FLP:  No results found for: TRIG, HDL, LDLCALC, LDLDIRECT, LABVLDL  TSH:    Lab Results   Component Value Date    TSH 2.720 09/11/2018     Troponin T: No results for input(s): TROPONINI in the last 72 hours. INR: No results for input(s): INR in the last 72 hours. No results for input(s): LIPASE in the last 72 hours. -----------------------------------------------------------------  RAD:       Physical Exam:     Vitals:    09/16/18 2316 09/17/18 0651 09/17/18 0702 09/17/18 1214   BP: 104/68 126/68  (!) 104/32   Pulse: 93 110  90   Resp: 18 20 18 20   Temp: 98 °F (36.7 °C) 98 °F (36.7 °C)  97.6 °F (36.4 °C)   TempSrc:  Temporal  Temporal   SpO2: 95% 92% 93% 90%   Weight:       Height:         24HR INTAKE/OUTPUT:      Intake/Output Summary (Last 24 hours) at 09/17/18 1337  Last data filed at 09/17/18 0537   Gross per 24 hour   Intake            977.5 ml   Output             1300 ml   Net           -322.5 ml     General appearance: 81 y/o male, ill appearing, BiPAP mask in place. Lungs: Diminished breath sounds with rhonchi noted, no wheezes or rales. Heart: irregularly irregular rhythm  Abdomen: soft, non-tender; bowel sounds normal; no masses,  no organomegaly  Extremities: extremities normal, atraumatic, no cyanosis or edema  Neurologic: No obvious focal neurologic deficits. Psychiatric:  Awake and confused. Skin:  Warm and dry. Impression:       1. Oropharyngeal Dysphagia - Now not tolerating oral intake due to respiratory status. 2.  MIRACLE - Improving, nephrology following. 3.  Possible aspiration pneumonia - on Zosyn. Pulmonology following. Sputum culture ordered. 4.  Atrial fibrillation - Cardiology following. On Cardizem. 5.  Hypernatremia - Nephrology following. Plan:     Prior to decline in respiratory status, he was tolerating a pureed diet.   Due to patient's worsening respiratory status, unable to workup further at this time. We will sign off for now. Please call if we can be of further assistance.     Tulio Justin PA-C  9/17/2018

## 2018-09-17 NOTE — PROGRESS NOTES
1835 PM DR Qureshi [facility] OFFICE NOTIFIED THAT PT MOVED TO CCU. SPOKE [address]. Electronically signed by Hedy Abreu RN on 9/17/2018 at 6:43 PM

## 2018-09-17 NOTE — PROGRESS NOTES
S1S2  Abdomen:  Soft NTND  Extremities:  none edema    Lab Data:  CBC: Recent Labs      09/15/18   0332  09/16/18   0329  09/17/18   0601   WBC  10.1  10.5  12.8*   HGB  16.1  15.5  16.2   HCT  49.4  46.4  48.5   MCV  100.0*  99.6*  99.8*   PLT  186  175  186     BMP: Recent Labs      09/15/18   0332  09/15/18   1532  09/16/18   0329  09/17/18   0601   NA  148*   --   150*  155*   K  3.9  4.1  3.4*  3.4*   CL  109   --   112*  112*   CO2  22   --   26  29   PHOS  2.9   --    --    --    BUN  84*   --   85*  75*   CREATININE  1.7*   --   1.4*  1.2     LIVER PROFILE:   Recent Labs      09/15/18   0332  09/16/18   0329  09/17/18   0601   AST  29  30  32   ALT  25  28  29   BILITOT  2.1*  1.9*  1.6*   ALKPHOS  43  45  59     PT/INR: No results for input(s): PROTIME, INR in the last 72 hours. APTT: No results for input(s): APTT in the last 72 hours. BNP:  No results for input(s): BNP in the last 72 hours. IMAGING:  Us Renal Complete    Result Date: 9/14/2018  US RENAL COMPLETE 9/14/2018 1:51 PM History: Acute renal failure. Grayscale and color flow ultrasound evaluation of both kidneys. Normal and symmetric kidneys. Normal cortical thickness and normal cortical echogenicity. No hydronephrosis. Right kidney = 9.6 x 4.9 x 4.9 cm. Cortical thickness = 1.3-1.4 cm. Left kidney = 10.9 x 5.1 x 5.1 cm. Cortical thickness = 1.2-1.7 cm. Bladder catheter present. The urinary bladder is decompressed and not well visualized. No free pelvic fluid is seen. 1. No abnormality is seen. Signed by Dr Ashley Hutchins on 9/14/2018 1:52 PM    Xr Chest Portable    Result Date: 9/16/2018  EXAM: XR CHEST PORTABLE -- 9/16/2018 10:30 AM HISTORY: 85 years, Male, hypoxemia COMPARISON: 9/15/2018 TECHNIQUE:  1 images. Frontal view of the chest. FINDINGS:  No pneumothorax. Similar right pulmonary opacity. Increased left pulmonary opacity. Similar nonenlarged cardiac and mediastinal silhouette. No acute bony findings.     1. Increased left

## 2018-09-17 NOTE — PROGRESS NOTES
infusion   Intravenous Continuous Carlos Alberto Vivas  mL/hr at 09/16/18 1351 100 mL/hr at 09/16/18 1351    piperacillin-tazobactam (ZOSYN) 3.375 g in dextrose 50 mL IVPB extended infusion (premix)  3.375 g Intravenous Q8H Oh Allen MD 12.5 mL/hr at 09/16/18 1549 3.375 g at 09/16/18 1549    ipratropium-albuterol (DUONEB) nebulizer solution 1 ampule  1 ampule Inhalation 4x daily Oh Allen MD   1 ampule at 09/16/18 1407    levofloxacin (LEVAQUIN) 750 MG/150ML infusion 750 mg  750 mg Intravenous Q48H Oh Allen MD   Stopped at 09/15/18 2239    acetylcysteine (MUCOMYST) 20 % solution 600 mg  600 mg Inhalation BID Ethelda Asai, APRN   600 mg at 09/16/18 1732    diltiazem (CARDIZEM CD) extended release capsule 120 mg  120 mg Oral BID Daniele Burns MD   120 mg at 09/15/18 1215    enoxaparin (LOVENOX) injection 60 mg  1 mg/kg Subcutaneous Daily Daniele Burns MD   60 mg at 09/16/18 5114    acetaminophen (TYLENOL) tablet 650 mg  650 mg Oral Q4H PRN Gustavo Carpenter MD        magnesium hydroxide (MILK OF MAGNESIA) 400 MG/5ML suspension 30 mL  30 mL Oral Daily PRN Gustavo Carpenter MD        propranolol (INDERAL) tablet 40 mg  40 mg Oral BID Gustavo Carpenter MD   40 mg at 09/15/18 1214    insulin lispro (HUMALOG) injection vial 0-6 Units  0-6 Units Subcutaneous TID WC Oh Allen MD   2 Units at 09/15/18 1818    insulin lispro (HUMALOG) injection vial 0-3 Units  0-3 Units Subcutaneous Nightly Oh Allen MD   1 Units at 09/14/18 2144    glucose (GLUTOSE) 40 % oral gel 15 g  15 g Oral PRN Oh Allen MD        dextrose 50 % solution 12.5 g  12.5 g Intravenous PRN Oh Allen MD        glucagon (rDNA) injection 1 mg  1 mg Intramuscular PRN Oh Allen MD        dextrose 5 % solution  100 mL/hr Intravenous PRN Oh Allen MD         Allergies: Patient has no known allergies.   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 No pertinent family history. Social History   Substance Use Topics    Smoking status: Former Smoker    Smokeless tobacco: Never Used    Alcohol use No          Review of Systems:    General:      Complaint / Symptom Yes / No / Description if Yes       Fatigue Yes:  chronic   Weight gain NA   Insomnia NA       Respiratory:        Complaint / Symptom Yes / No / Description if Yes       Cough No   Horseness NA       Cardiovascular:    Complaint / Symptom Yes / No / Description if Yes       Chest Pain No   Shortness of Air / Orthopnea Yes: chronic and stable   Presyncope / Syncope No   Palpitations No         Objective:    BP (!) 98/58   Pulse 100   Temp 98.1 °F (36.7 °C)   Resp 18   Ht 5' 6\" (1.676 m)   Wt 135 lb (61.2 kg)   SpO2 97%   BMI 21.79 kg/m² ,   Intake/Output Summary (Last 24 hours) at 09/16/18 1951  Last data filed at 09/16/18 1550   Gross per 24 hour   Intake             1329 ml   Output             1770 ml   Net             -441 ml       GENERAL - well developed and well nourished, is somewhat an active participant in this examination  HEENT   PERRLA, Hearing appears normal, conjunctiva and lids are normal, ears and nose appear normal  NECK - no thyromegaly, no JVD, trachea is in the midline  CARDIOVASCULAR  PMI is in the left mid line clavicular position, Variable S1, normal S2, without obvious murmur or gallop   PULMONARY  Yes: mild respiratory distress. scattered wheezes and rales.   Breath sounds in both  lung fields are Decreased  ABDOMEN   soft, non tender, no rebound, no hepatomegaly or splenomegaly  MUSCULOSKELETAL   Prone/Supine, digitals and nails are without clubbing or cyanosis  EXTREMITIES - trace edema  NEUROLOGIC - cranial nerves, II-XII, are normal  SKIN - turgor is normal, no

## 2018-09-17 NOTE — PROGRESS NOTES
Nephrology ([facility]) Progress Note    Patient:  Shaheed Frost  YOB: 1933  Date of Service: 9/17/2018  MRN: [id number]   Acct: [de-identified]   Primary Care Physician: No primary care provider on file. Advance Directive: Full Code  Admit Date: 9/11/2018       Hospital Day: 6  Referring Provider: Lance Tapia MD    Patient independently seen and examined, Chart, Consults, Notes, Operative notes, Labs, Cardiology, and Radiology studies reviewed as able. Subjective:  Shaheed Frost is a 80 y.o. male  whom we were consulted for consulted for acute kidney injury. Patient does not have any history of chronic kidney disease. Patient was initially admitted to psych floor for severe hallucination and later on he was found to have tachycardia, shortness of breath and productive cough. He was diagnosed with aspiration pneumonia. Incidental finding of the labs shows creatinine was 3.1 mg with baseline serum creatinine was 1.0. Patient was found to have severe bladder distention on bladder scan and had a carmichael cath placed     This morning he is still disoriented and confused on bipap. No IVF given per RN despite orders in the computer for d5. Increasing hypoxia and unable to take any po. Allergies:  Patient has no known allergies.     Medicines:  Current Facility-Administered Medications   Medication Dose Route Frequency Provider Last Rate Last Dose    enoxaparin (LOVENOX) injection 60 mg  1 mg/kg Subcutaneous BID Mireya Rubio MD        furosemide (LASIX) injection 40 mg  40 mg Intravenous Daily Mireya Rubio MD        dextrose 5 % with KCl 20 mEq infusion   Intravenous Continuous Melchor Marie  mL/hr at 09/17/18 1206 100 mL/hr at 09/17/18 1206    piperacillin-tazobactam (ZOSYN) 3.375 g in dextrose 50 mL IVPB extended infusion (premix)  3.375 g Intravenous Susan Mera MD 12.5 mL/hr at 09/17/18 0758 3.375 g at 09/17/18 0758    ipratropium-albuterol

## 2018-09-18 NOTE — PROGRESS NOTES
to auscultation, respiration easy  Cardiovascular:  RRR S1S2  Abdomen:  Soft ntnd  Extremities:  none edema    Lab Data:  CBC: Recent Labs      09/16/18 0329 09/17/18   0601 09/18/18   0235   WBC  10.5  12.8*  22.0*   HGB  15.5  16.2  15.8   HCT  46.4  48.5  46.2   MCV  99.6*  99.8*  97.3*   PLT  175  186  184     BMP: Recent Labs      09/16/18   0329 09/17/18   0601 09/17/18   1647  09/18/18   0235   NA  150*  155*   --   153*   K  3.4*  3.4*  3.1  3.3*   CL  112*  112*   --   112*   CO2  26  29   --   26   BUN  85*  75*   --   82*   CREATININE  1.4*  1.2   --   1.5*     LIVER PROFILE:   Recent Labs      09/16/18 0329 09/17/18   0601 09/18/18   0235   AST  30  32  32   ALT  28  29  27   BILITOT  1.9*  1.6*  1.5*   ALKPHOS  45  59  72     PT/INR: No results for input(s): PROTIME, INR in the last 72 hours. APTT: No results for input(s): APTT in the last 72 hours. BNP:  No results for input(s): BNP in the last 72 hours. IMAGING:  Us Renal Complete    Result Date: 9/14/2018  US RENAL COMPLETE 9/14/2018 1:51 PM History: Acute renal failure. Grayscale and color flow ultrasound evaluation of both kidneys. Normal and symmetric kidneys. Normal cortical thickness and normal cortical echogenicity. No hydronephrosis. Right kidney = 9.6 x 4.9 x 4.9 cm. Cortical thickness = 1.3-1.4 cm. Left kidney = 10.9 x 5.1 x 5.1 cm. Cortical thickness = 1.2-1.7 cm. Bladder catheter present. The urinary bladder is decompressed and not well visualized. No free pelvic fluid is seen. 1. No abnormality is seen. Signed by Dr Calvert Mohs on 9/14/2018 1:52 PM    Xr Chest Portable    Result Date: 9/16/2018  EXAM: XR CHEST PORTABLE -- 9/16/2018 10:30 AM HISTORY: 85 years, Male, hypoxemia COMPARISON: 9/15/2018 TECHNIQUE:  1 images. Frontal view of the chest. FINDINGS:  No pneumothorax. Similar right pulmonary opacity. Increased left pulmonary opacity. Similar nonenlarged cardiac and mediastinal silhouette.  No acute bony due to pulmonary edema. Pneumonia is considered less likely given the abrupt interval appearance. Signed by Dr Reginaldo Cevallos on 9/11/2018 6:27 PM    Xr Chest Portable    Result Date: 9/11/2018  XR CHEST PORTABLE 9/11/2018 1:08 PM History: Productive cough. Low O2 saturation. Portable chest x-ray with no comparison. The heart size is normal. The mediastinum is within normal limits. The lungs are normally expanded with no pneumonia or pneumothorax. No congestive failure changes. 1. No acute disease. Signed by Dr Remberto Chapa on 9/11/2018 1:08 PM      Assessment:  Patient Active Problem List    Diagnosis Date Noted    Atrial fibrillation by electrocardiogram [facility])      Priority: High    Hypernatremia      Priority: Low    Hypoxia      Priority: Low    Pulmonary edema 09/15/2018     Priority: Low    Dysphagia      Priority: Low    Cough      Priority: Low    Acute respiratory failure with hypoxia (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 Continue iv diltiazem transition oral meds when able ? Peg tube  2.  Continue iv diuresis monitor daily electrolytes etc    Mj Rader MD 9/18/2018 8:58 AM

## 2018-09-18 NOTE — PROGRESS NOTES
BILITOT   --    < >  1.9*  1.6*   --   1.5*    < > = values in this interval not displayed. No results for input(s): BC, LABGRAM, CULTRESP, BFCX in the last 72 hours. Radiograph:  None today  My radiograph interpretation: none today     Pulmonary Assessment:    1. Acute hypoxemic respiratory failure due to pulmonary edema. 2. Volume overload  3. Aspiration pneumonia  4. Hypernatremia  5. Poor oral intake due to dysphagia  6. Atrial fibrillation with RVR  7. Encephalopathy  8. Leukocytosis  9. MIRACLE  10. Hypokalemia    Recommend:     · Continue broad spectrum coverage for now  · Repeat CXR today  · ABG in the am  · Bipap with sleep and as needed for lethargy and respiratory fatigue  · Now on cardizem gtt for afib with RVR. Defer to cardiology  · IVF for hypernatremia. · He has not gotten nutrition in a few days. May need to consider nutrition via dobhoff and free water boluses to avoid worsening volume overload. Will defer to renal  · Diuresis per cardiology  · Notes indicate code status addressed with family yesterday who wishes for him to remain a full code and are ok with intubation if he has failure on bipap therapy. Would recommend this as a last resort as I do not think he would be easy to wean from the vent if he were to improve  · Continue bronchodilators and mucolytics for now    Electronically signed by Josue Brunson on 9/18/2018 at 8:03 AM   Physician's substantive portion:  Subjective: The patient remains on BiPAP. Objective: He has coarse rales and rhonchi on chest exam.  Assessment/recommendation: His chest x-ray shows persistent bilateral perihilar infiltrates. I suspect this relates in some degree to cardiogenic pulmonary edema but he could also have some noncardiogenic pulmonary edema. The treatment of either includes diuretic therapy if his metabolic and renal status will tolerate. His prognosis is extremely poor.    I have seen and examined patient personally, performing a face-to-face

## 2018-09-18 NOTE — PROGRESS NOTES
TF & Flush Orders Provides: 0  · Goal TF & Flush Orders Provides: 50ml = 1440 kcals with 72g protein, 132 g CHO and 966ml free water from tf formula, 720ml free water from flush    · Anthropometric Measures:  · Ht: 5' 6\" (167.6 cm)   · Current Body Wt: 126 lb 14 oz (57.6 kg)  · Admission Body Wt: 135 lb (61.2 kg)  · % Weight Change: 6.6%,  4 days  · Ideal Body Wt: 142 lb (64.4 kg), % Ideal Body 88.8%  · BMI Classification: BMI 18.5 - 24.9 Normal Weight     · Comparative Standards (Estimated Nutrition Needs):  · Estimated Daily Total Kcal: 2115-6671 kcals  · Estimated Daily Protein (g): 58-70g  · Estimated Daily Fluid (ml/day): 7772-5177 ml    Estimated Intake vs Estimated Needs: Intake Less Than Needs    Nutrition Risk Level: High    Nutrition Interventions:   Continue NPO, Start Tube Feeding  Continued Inpatient Monitoring    Nutrition Evaluation:   · Evaluation: Progressing toward goals   · Goals: diet progression or nutrition support   · Monitoring: NPO Status, TF Intake, TF Tolerance, Gastric Residuals, Skin Integrity, Wound Healing, Fluid Balance, Weight, Pertinent Labs    See Adult Nutrition Doc Flowsheet for more detail.      Electronically signed by Mamie Rubinstein, MS, RD, LD on 9/18/18 at 2:49 PM    Contact Number: [phone]

## 2018-09-18 NOTE — PROGRESS NOTES
5 pm pt from 4th. following rapid response. for sob. a-fibb rate 155. pt confused. history pt on behavavior health floor when adfmitted. plan to start cardizem per dr Tasia Aguirre. pt on bipap.

## 2018-09-18 NOTE — PROGRESS NOTES
Progress Note  Brady Moyer  9/18/2018 12:30 PM  Subjective:   Admit Date:   9/11/2018      CC/ADMIT DX:       Interval History:   Reviewed overnight events and nursing notes. He is stable in CCU. He has had no new issues. He remains on BIPAP with adequate oxygenation. No pain. I have reviewed all labs/diagnostics from the last 24hrs. ROS:   I have done a 10 point ROS and all are negative, except what is mentioned in the HPI. DIET CARB CONTROL; Carb Control: 4 carb choices (60 gms)/meal; Dysphagia I Pureed; Honey Thick    Medications:      diltiazem (CARDIZEM) 125 mg in dextrose 5% 125 mL infusion 10 mg/hr (09/18/18 0111)    dextrose 5 % with KCl 20 mEq 100 mL/hr (09/18/18 1142)    dextrose        enoxaparin  1 mg/kg Subcutaneous Q24H    potassium chloride  10 mEq Intravenous Q1H    insulin lispro  0-12 Units Subcutaneous TID WC    insulin lispro  0-6 Units Subcutaneous Nightly    furosemide  40 mg Intravenous Daily    piperacillin-tazobactam  3.375 g Intravenous Q8H    ipratropium-albuterol  1 ampule Inhalation 4x daily    levofloxacin  750 mg Intravenous Q48H    acetylcysteine  600 mg Inhalation BID    propranolol  40 mg Oral BID           Objective:   Vitals: BP 93/63   Pulse 106   Temp 98.5 °F (36.9 °C) (Temporal)   Resp 22   Ht 5' 6\" (1.676 m)   Wt 126 lb 14.4 oz (57.6 kg)   SpO2 91%   BMI 20.48 kg/m²      Intake/Output Summary (Last 24 hours) at 09/18/18 1230  Last data filed at 09/18/18 1155   Gross per 24 hour   Intake             4413 ml   Output             1480 ml   Net             2933 ml     General appearance: on BIPAP  Lungs: coarse throughout  Heart: RRR  Abdomen: soft, non-tender; bowel sounds normal; no masses,  no organomegaly  Extremities: extremities normal, atraumatic, no cyanosis or edema  Neurologic:  No obvious focal neurologic deficits.     Assessment and Plan:   Acute Resp Failure with Hypoxia  Probable Aspiration Pneumonia  Psychosis  Adult FTT  DM2  MIRACLE  New

## 2018-09-18 NOTE — PROGRESS NOTES
Dobbhoff placed at 60 marker with air bolus and xray completed waiting for results.  Removed BiPAP pulse ox dropped to 75 once inserted and secured BiPAP back on and slowly pulse ox increased to 88-90

## 2018-09-18 NOTE — PROGRESS NOTES
3.375 g in dextrose 50 mL IVPB extended infusion (premix)  3.375 g Intravenous Q8H Keily Short MD   Stopped at 09/18/18 0831    ipratropium-albuterol (DUONEB) nebulizer solution 1 ampule  1 ampule Inhalation 4x daily Keily Short MD   1 ampule at 09/18/18 1051    levofloxacin (LEVAQUIN) 750 MG/150ML infusion 750 mg  750 mg Intravenous Q48H Keily Short MD   Stopped at 09/17/18 2214    acetylcysteine (MUCOMYST) 20 % solution 600 mg  600 mg Inhalation BID Maurice Dantrent APRN   600 mg at 09/18/18 0450    acetaminophen (TYLENOL) tablet 650 mg  650 mg Oral Q4H PRN Juliana Celis MD        magnesium hydroxide (MILK OF MAGNESIA) 400 MG/5ML suspension 30 mL  30 mL Oral Daily PRN Juliana Celis MD        propranolol (INDERAL) tablet 40 mg  40 mg Oral BID Juliana Celis MD   40 mg at 09/15/18 1214    insulin lispro (HUMALOG) injection vial 0-6 Units  0-6 Units Subcutaneous TID WC Keily Short MD   6 Units at 09/18/18 0553    insulin lispro (HUMALOG) injection vial 0-3 Units  0-3 Units Subcutaneous Nightly Keily Short MD   1 Units at 09/17/18 2103    glucose (GLUTOSE) 40 % oral gel 15 g  15 g Oral PRN Keily Short MD        dextrose 50 % solution 12.5 g  12.5 g Intravenous PRN Keily Short MD        glucagon (rDNA) injection 1 mg  1 mg Intramuscular PRN Keily Short MD        dextrose 5 % solution  100 mL/hr Intravenous JUSTAN Keily Short MD           Past Medical History:  Past Medical History:   Diagnosis Date    Asthma     Diabetes mellitus ([address]     Hyperlipidemia     Hypertension     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 No pertinent family history. Social History  Social History     Social History    Marital status:       Spouse name: N/A    Number of children: 4    Years of education: 15 input(s): IONCA in the last 72 hours. Magnesium:  No results for input(s): MG in the last 72 hours. Phosphorus:  No results for input(s): PHOS in the last 72 hours. HgbA1C: No results for input(s): LABA1C in the last 72 hours. Hepatic:   Recent Labs      09/16/18   0329  09/17/18   0601  09/18/18   0235   ALKPHOS  45  59  72   ALT  28  29  27   AST  30  32  32   PROT  6.2*  6.2*  5.8*   BILITOT  1.9*  1.6*  1.5*   LABALBU  2.5*  2.5*  2.3*     Lactic Acid: No results for input(s): LACTA in the last 72 hours. Troponin: No results for input(s): CKTOTAL, CKMB, TROPONINT in the last 72 hours. ABGs:   Lab Results   Component Value Date    PHART 7.510 09/17/2018    PO2ART 77.0 09/17/2018    LOU7VAM 33.0 09/17/2018     CRP:  No results for input(s): CRP in the last 72 hours. Sed Rate:  No results for input(s): SEDRATE in the last 72 hours. Culture Results:   Blood Culture Recent: No results for input(s): BC in the last 720 hours. Urine Culture Recent : No results for input(s): LABURIN in the last 720 hours. Radiology reports as per the Radiologist  Radiology: Us Renal Complete    Result Date: 9/14/2018  US RENAL COMPLETE 9/14/2018 1:51 PM History: Acute renal failure. Grayscale and color flow ultrasound evaluation of both kidneys. Normal and symmetric kidneys. Normal cortical thickness and normal cortical echogenicity. No hydronephrosis. Right kidney = 9.6 x 4.9 x 4.9 cm. Cortical thickness = 1.3-1.4 cm. Left kidney = 10.9 x 5.1 x 5.1 cm. Cortical thickness = 1.2-1.7 cm. Bladder catheter present. The urinary bladder is decompressed and not well visualized. No free pelvic fluid is seen. 1. No abnormality is seen. Signed by Dr Perla Brock on 9/14/2018 1:52 PM    Xr Chest Portable    Result Date: 9/16/2018  EXAM: XR CHEST PORTABLE -- 9/16/2018 10:30 AM HISTORY: 85 years, Male, hypoxemia COMPARISON: 9/15/2018 TECHNIQUE:  1 images. Frontal view of the chest. FINDINGS:  No pneumothorax.  Similar right

## 2018-09-19 NOTE — PROGRESS NOTES
Dr Megan Madrid in unit evaluating pt.  Ok for PICC line placement  Electronically signed by Luisa Hamlin RN on 9/19/2018 at 9:53 AM

## 2018-09-19 NOTE — PROGRESS NOTES
Cardiology Daily Note Yann Lezama MD      Patient:  Kelin Newman  [id number]    Patient Active Problem List    Diagnosis Date Noted    Atrial fibrillation by electrocardiogram [facility])      Priority: High    Hypernatremia      Priority: Low    Hypoxia      Priority: Low    Pulmonary edema 09/15/2018     Priority: Low    Dysphagia      Priority: Low    Cough      Priority: Low    Acute respiratory failure with hypoxia ([address] 09/12/2018     Priority: Low    Psychotic disorder with hallucinations 09/11/2018     Priority: Low       Admit Date:  9/11/2018    Admission Problem List: LINDSEY/Shel[facility] [address] Problems    Diagnosis Date Noted    Atrial fibrillation by electrocardiogram [facility]) [I48.91]      Priority: High    Hypernatremia [E87.0]      Priority: Low    Hypoxia [R09.02]      Priority: Low    Pulmonary edema [J81.1] 09/15/2018     Priority: Low    Dysphagia [R13.10]      Priority: Low    Cough [R05]      Priority: Low    Acute respiratory failure with hypoxia ([address] [J96.01] 09/12/2018     Priority: Low       Subjective:  Mr. Baron Camejo seen today PICC line to be placed. BP somewhat soft fluids given on small dose iv diltiazem rate controlled.     Objective:   BP (!) 108/49   Pulse 94   Temp 98.5 °F (36.9 °C) (Temporal)   Resp 30   Ht 5' 6\" (1.676 m)   Wt 128 lb 9.6 oz (58.3 kg)   SpO2 (!) 89%   BMI 20.76 kg/m²       Intake/Output Summary (Last 24 hours) at 09/19/18 1055  Last data filed at 09/19/18 0929   Gross per 24 hour   Intake           3230.5 ml   Output              700 ml   Net           2530.5 ml        lidocaine 1 % injection  5 mL Intradermal Once    insulin glargine  15 Units Subcutaneous Nightly    vancomycin  750 mg Intravenous Once    vancomycin (VANCOCIN) intermittent dosing (placeholder)   Other RX Placeholder    sodium chloride  500 mL Intravenous Once    enoxaparin  1 mg/kg Subcutaneous Q24H    insulin lispro  0-12 Units Subcutaneous TID WC    insulin lispro  0-6 Units Subcutaneous Nightly    piperacillin-tazobactam  3.375 g Intravenous Q8H    ipratropium-albuterol  1 ampule Inhalation 4x daily    levofloxacin  750 mg Intravenous Q48H    acetylcysteine  600 mg Inhalation BID    propranolol  40 mg Oral BID       TELEMETRY: Atrial fibrillation     Physical Exam:    [unfilled]    Physical Exam      General:  Awake, alert, NAD  Skin:  Warm and dry  Neck:  no jvd , no carotid bruits  Chest:  Clear to auscultation, no wheezing or rales  Cardiovascular:  IRRR C0L1 no murmurs, clicks, gallups, or rubs  Abdomen:  Soft nontender, nondistended, bowel sounds present  Extremities:  no edema     Lab Data:  CBC: Recent Labs      09/17/18 0601 09/18/18 0235 09/19/18 0132   WBC  12.8*  22.0*  38.2*   HGB  16.2  15.8  14.5   HCT  48.5  46.2  44.6   MCV  99.8*  97.3*  101.8*   PLT  186  184  161     BMP: Recent Labs      09/17/18 0601 09/18/18 0235 09/19/18 0132 09/19/18 0222 09/19/18   0458   NA  155*   --   153*   --   147*   --    --    K  3.4*   < >  3.3*   < >  4.0  3.9  4.1   CL  112*   --   112*   --   109   --    --    CO2  29   --   26   --   23   --    --    BUN  75*   --   82*   --   80*   --    --    CREATININE  1.2   --   1.5*   --   1.7*   --    --     < > = values in this interval not displayed. LIVER PROFILE:   Recent Labs      09/17/18 0601 09/18/18 0235 09/19/18 0132   AST  32  32  38   ALT  29  27  27   BILITOT  1.6*  1.5*  1.4*   ALKPHOS  59  72  100     PT/INR: No results for input(s): PROTIME, INR in the last 72 hours. APTT: No results for input(s): APTT in the last 72 hours. BNP:  No results for input(s): BNP in the last 72 hours. CK, CKMB, Troponin: @LABRCNT (CKTOTAL:3, CKMB:3, TROPONINI:3)@    IMAGING:  Us Renal Complete    Result Date: 9/14/2018  US RENAL COMPLETE 9/14/2018 1:51 PM History: Acute renal failure. Grayscale and color flow ultrasound evaluation of both kidneys. Normal and symmetric kidneys.  Normal cortical thickness and normal cortical echogenicity. No hydronephrosis. Right kidney = 9.6 x 4.9 x 4.9 cm. Cortical thickness = 1.3-1.4 cm. Left kidney = 10.9 x 5.1 x 5.1 cm. Cortical thickness = 1.2-1.7 cm. Bladder catheter present. The urinary bladder is decompressed and not well visualized. No free pelvic fluid is seen. 1. No abnormality is seen. Signed by Dr Bruce Nowak on 9/14/2018 1:52 PM    Xr Chest Portable    Result Date: 9/19/2018  History: 80-year-old with OG placement. Reference: Chest radiograph earlier today. Findings: Frontal chest radiograph performed. Heart and mediastinum unchanged. Pulmonary opacities are unchanged. ET tube remains in good position. The weighted feeding tube is been replaced with an OG tube, tip at expected gastric antrum/duodenal bulb. Satisfactory OG tube. Signed by Dr Chas Orta on 9/19/2018 7:57 AM    Xr Chest Portable    Result Date: 9/19/2018  History: 80-year-old with intubation. Reference: Chest radiograph one day prior. Findings: Frontal chest radiograph performed. Diffuse patchy opacities more severe in the perihilar regions. No significant change from yesterday. Heart and mediastinum unchanged. No pleural effusion or pneumothorax. Patient has been intubated, ET tube 5 cm above the arnold. Weighted feeding tube, tip in gastric fundus, unchanged. Satisfactory ET tube. Persistent airspace opacities, edema and/or pneumonia. Signed by Dr Chas Orta on 9/19/2018 7:37 AM    Xr Chest Portable    Result Date: 9/18/2018  EXAMINATION: XR CHEST PORTABLE 9/18/2018 9:42 PM HISTORY: Shortness of breath COMPARISON: 9/18/2018 at 2:45 PM FINDINGS: Heart and mediastinal contours are stable. A Dobbhoff tube is again seen with tip overlying the left upper quadrant, presumably in the stomach. Bilateral interstitial and alveolar opacities are again seen, predominately in the perihilar regions. There is no appreciable pneumothorax or definite pleural effusion.     No significant interval change in appearance of the comparison. The heart size is normal. The mediastinum is within normal limits. The lungs are normally expanded with no pneumonia or pneumothorax. No congestive failure changes. 1. No acute disease. Signed by Dr Juan Gomes on 9/11/2018 1:08 PM      Assessment:  1. Atrial fibrillation  2. Hypotension  3. ?aspiration  4. Mental status changes   5. HTN  6. Diabetes  7. Hyperlipidemia  8. COPD  9. CKD BUN 80 CR 1.7  10. Echo 9/17 EF 50%  Plan:  1. Continue current tx   2.  Additional fluids ordered and lasix stopped today     Jesusita Ferraro MD 9/19/2018 10:56 AM

## 2018-09-19 NOTE — PROGRESS NOTES
Pharmacy Note  Vancomycin Consult    Susu Gore is a 80 y.o. male started on Vancomycin for aspiration pneumonia; consult received from Dr. Patrice Lundy to manage therapy. Also receiving the following antibiotics: Levaquin, Zosyn. Patient Active Problem List   Diagnosis    Psychotic disorder with hallucinations    Acute respiratory failure with hypoxia (HCC)    Atrial fibrillation by electrocardiogram ([address]    Dysphagia    Cough    Pulmonary edema    Hypernatremia    Hypoxia       Allergies:  Patient has no known allergies. Temp max: 99.3    Recent Labs      09/18/18   0235  09/19/18   0132   BUN  82*  80*       Recent Labs      09/18/18   0235  09/19/18   0132   CREATININE  1.5*  1.7*       Recent Labs      09/18/18   0235  09/19/18   0132   WBC  22.0*  38.2*         Intake/Output Summary (Last 24 hours) at 09/19/18 1004  Last data filed at 09/19/18 0730   Gross per 24 hour   Intake 3230.5 ml   Output 650 ml   Net 2580.5 ml       Culture Date Source Results   09/19/18 Blood x 2 Collected   09/19/18 Respiratory Collected            Ht Readings from Last 1 Encounters:   09/13/18 5' 6\" (1.676 m)        Wt Readings from Last 1 Encounters:   09/19/18 128 lb 9.6 oz (58.3 kg)         Body mass index is 20.76 kg/m². Estimated Creatinine Clearance: 26 mL/min (A) (based on SCr of 1.7 mg/dL (H)). Assessment/Plan:  Will initiate vancomycin pulse dosing. Timing of trough level will be determined based on culture results, renal function, and clinical response. Thank you for the consult. Will continue to follow.     Electronically signed by Don Ceballos, [facility] on 9/19/2018 at 10:04 AM

## 2018-09-19 NOTE — CONSULTS
INFECTIOUS DISEASES CONSULT NOTE    Patient:  Shaka Gilliam 3 y.o. male  ROOM # [unfilled]  YOB: 1933  MRN: [id number]  CSN:  [id number]  Admit date: 9/11/2018   Admitting Physician: Kalli Red MD  Primary Care Physician: No primary care provider on file. REFERRING PROVIDER: No ref. provider found    Reason for Consultation: Recommendations for antibiotic management    History of Present Illness/Chief Complaint: 70-year-old man. History is obtained from nursing, chart review a female friend who sees him every 2 weeks or so, and his stepson. When his friend saw him 3-4 weeks ago she indicates she was doing fine. They indicate about 2 weeks ago he started to experience some hallucinations. They report he called his next door neighbor with him he has good friends. He told them there was some people in his home. The neighbors came over and couldn't find no one in the home. Encouraged them to relax getting everything was okay. Apparently the neighbor's found him on the front porch of their house about 4:00 in the morning. He was scared. He was indicating it was still people he can see in his home. They apparently helped transport him to [facility]. He was then transferred after 2 days to [facility]. He was apparently then transferred to 4th floor [facility]. From what nursing reports she did experience some respiratory decline. He was requiring increasing FiO2. He was found to have evidence of aspiration. He was transferred to CCU. He had been on BiPAP. He then he required intubation and mechanical ventilation. Infectious disease asked to evaluate and offer recommendations. He is also had problems with atrial fibrillation.     Current Scheduled Medications:    insulin glargine  15 Units Subcutaneous Nightly    vancomycin (VANCOCIN) intermittent dosing (placeholder)   Other RX Placeholder    diltiazem  30 mg Per NG tube 4 times per day    enoxaparin  1 mg/kg Subcutaneous Q24H    09/18/18   0235 09/19/18   0132   WBC  12.8*  22.0*  38.2*   HGB  16.2  15.8  14.5   HCT  48.5  46.2  44.6   PLT  186  184  161   LYMPHOPCT  7.0*  9.0*  4.0*   MONOPCT  5.0  5.0  4.0     CMP:   Recent Labs      09/17/18   0601   09/18/18   0235   09/19/18   0132  09/19/18   0222  09/19/18   0458   NA  155*   --   153*   --   147*   --    --    K  3.4*   < >  3.3*   < >  4.0  3.9  4.1   CL  112*   --   112*   --   109   --    --    CO2  29   --   26   --   23   --    --    BUN  75*   --   82*   --   80*   --    --    CREATININE  1.2   --   1.5*   --   1.7*   --    --    CALCIUM  8.7*   --   8.4*   --   8.3*   --    --    BILITOT  1.6*   --   1.5*   --   1.4*   --    --    ALKPHOS  59   --   72   --   100   --    --    ALT  29   --   27   --   27   --    --    AST  32   --   32   --   38   --    --    GLUCOSE  266*   --   304*   --   318*   --    --     < > = values in this interval not displayed. Culture: Blood cultures ×2 no growth    Radiology:   Chest x-ray today:  Impression   Satisfactory ET tube. Persistent airspace opacities, edema and/or   pneumonia. Signed by Dr Jael Sainz on 9/19/2018 7:37 AM     Additional Studies Reviewed:     Impression:   1. Acute respiratory failure with hypoxia-suspect probable aspiration pneumonia  2. Recent episode of hallucinations  3. Acute renal insufficiency  4. Atrial fibrillation  5. Diabetes mellitus  6.  Leukocytosis    Recommendations:    Would suggest broad antibiotic treatment vancomycin, meropenem and azithromycin  We'll send urine Legionella and pneumococcal antigen  Continue supportive care  Continue to follow    Sebastian Montgomery MD  09/19/18  4:40 PM

## 2018-09-19 NOTE — PROGRESS NOTES
Progress Note  Myranda Sainz  9/19/2018 9:34 AM  Subjective:   Admit Date:   9/11/2018      CC/ADMIT DX:       Interval History:   Reviewed overnight events and nursing notes. He had a decline in his respiratory status last night, and was intubated. He is stable on vent. His BP has been low with sedation, so that has been held. He is in NAD currently. I have reviewed all labs/diagnostics from the last 24hrs. ROS:   I have done a 10 point ROS and all are negative, except what is mentioned in the HPI. DIET TUBE FEED CONTINUOUS/CYCLIC NPO; Diabetic (glucerna 1.2);  Nasogastric; Continuous; 30; 50; 24    Medications:      propofol Stopped (09/19/18 0430)    diltiazem (CARDIZEM) 125 mg in dextrose 5% 125 mL infusion 2.5 mg/hr (09/19/18 0600)    dextrose 5 % with KCl 20 mEq 50 mL/hr (09/19/18 0046)    dextrose        lidocaine 1 % injection  5 mL Intradermal Once    insulin glargine  15 Units Subcutaneous Nightly    enoxaparin  1 mg/kg Subcutaneous Q24H    insulin lispro  0-12 Units Subcutaneous TID WC    insulin lispro  0-6 Units Subcutaneous Nightly    piperacillin-tazobactam  3.375 g Intravenous Q8H    ipratropium-albuterol  1 ampule Inhalation 4x daily    levofloxacin  750 mg Intravenous Q48H    acetylcysteine  600 mg Inhalation BID    propranolol  40 mg Oral BID           Objective:   Vitals: BP (!) 66/28   Pulse 104   Temp 98.5 °F (36.9 °C) (Temporal)   Resp (!) 31   Ht 5' 6\" (1.676 m)   Wt 128 lb 9.6 oz (58.3 kg)   SpO2 94%   BMI 20.76 kg/m²      Intake/Output Summary (Last 24 hours) at 09/19/18 0934  Last data filed at 09/19/18 0730   Gross per 24 hour   Intake           3230.5 ml   Output              650 ml   Net           2580.5 ml     General appearance: on vent  Lungs: coarse throughout  Heart: RRR  Abdomen: soft, non-tender; bowel sounds normal; no masses,  no organomegaly  Extremities: extremities normal, atraumatic, no cyanosis or edema  Neurologic:  No obvious focal

## 2018-09-20 NOTE — PROGRESS NOTES
Dr Melanie Gustafson paged to notify pt SBP 60-70. 250 cc Bolus orders giiven. Dr Brigida Pascual notified.   Electronically signed by Stephan Beltran RN on 9/19/2018 at 7:44 PM

## 2018-09-20 NOTE — PROGRESS NOTES
50%.   Signature   ----------------------------------------------------------------   Electronically signed by Eileen Hidalgo MD(Interpreting   physician) on 09/14/2018 02:55 PM   ----------------------------------------------------------------    Impression:  1. Bilateral pulmonary infiltrates  2. Leukocytosis  3. Respiratory failure  4. Acute renal insufficiency    Recommendations:  Do not feel we can expand antibiotic treatment. Feel he is on maximal treatment from antimicrobial standpoint.   Continue supportive care  Await cultures  Continue support    Jim Jimenez MD

## 2018-09-20 NOTE — PROGRESS NOTES
Progress Note  [facility]  9/20/2018 9:21 AM  Subjective:   Admit Date:   9/11/2018      CC/ADMIT DX:       Interval History:   Reviewed overnight events and nursing notes. He is intubated and sedated. I have reviewed all labs/diagnostics from the last 24hrs. ROS:   I have done a 10 point ROS and all are negative, except what is mentioned in the HPI. DIET TUBE FEED CONTINUOUS/CYCLIC NPO; Diabetic (glucerna 1.2);  Nasogastric; Continuous; 30; 50; 24    Medications:      propofol 17 mcg/kg/min (09/20/18 0714)    norepinephrine 6 mcg/min (09/20/18 0530)    dextrose 5 % with KCl 20 mEq 100 mL/hr (09/20/18 0152)    dextrose        meropenem  500 mg Intravenous Q12H    insulin glargine  15 Units Subcutaneous Nightly    vancomycin (VANCOCIN) intermittent dosing (placeholder)   Other RX Placeholder    diltiazem  30 mg Per NG tube 4 times per day    azithromycin  500 mg Intravenous Q24H    enoxaparin  1 mg/kg Subcutaneous Q24H    insulin lispro  0-12 Units Subcutaneous TID WC    insulin lispro  0-6 Units Subcutaneous Nightly    ipratropium-albuterol  1 ampule Inhalation 4x daily    acetylcysteine  600 mg Inhalation BID    propranolol  40 mg Oral BID           Objective:   Vitals: BP (!) 113/51   Pulse 85   Temp 98.4 °F (36.9 °C) (Temporal)   Resp 23   Ht 5' 6\" (1.676 m)   Wt 135 lb (61.2 kg)   SpO2 92%   BMI 21.79 kg/m²      Intake/Output Summary (Last 24 hours) at 09/20/18 7369  Last data filed at 09/20/18 0830   Gross per 24 hour   Intake          5774.56 ml   Output             1380 ml   Net          4394.56 ml     General appearance: on vent  Lungs: coarse throughout  Heart: RRR  Abdomen: soft, non-tender; bowel sounds normal; no masses,  no organomegaly  Extremities: extremities normal, atraumatic, no cyanosis or edema  Neurologic:  sedated    Assessment and Plan:   Acute Resp Failure with Hypoxia  Probable Aspiration Pneumonia  Psychosis  Adult FTT  DM2  MIRACLE  New onset A

## 2018-09-20 NOTE — PROGRESS NOTES
9/20/2018  4:25 AM - Graeme Lee Incoming Lab Results From [facility]     Component Results     Component Value Ref Range & Units Status Collected Lab   pH, Arterial 7.440  7.350 - 7.450 Final 09/20/2018  4:25 AM [facility] Lab   pCO2, Arterial 37.0  35.0 - 45.0 mmHg Final 09/20/2018  4:25 AM [facility] Lab   pO2, Arterial 66.0   80.0 - 100.0 mmHg Final 09/20/2018  4:25 AM [facility] Lab   HCO3, Arterial 25.1  22.0 - 26.0 mmol/L Final 09/20/2018  4:25 AM [facility] Excess, Arterial 1.1  -2.0 - 2.0 mmol/L Final 09/20/2018  4:25 AM [facility] Lab   Hemoglobin, Art, Extended 13.4   14.0 - 18.0 g/dL Final 09/20/2018  4:25 AM [facility] Lab   O2 Sat, Arterial 93.7  >92 % Final 09/20/2018  4:25 AM [facility] Lab   Carboxyhgb, Arterial 2.4  0.0 - 5.0 % Final 09/20/2018  4:25 AM [facility] Lab        0.0-1.5   (Smokers 1.5-5.0)    Methemoglobin, Arterial 1.7  <1.5 % Final 09/20/2018  4:25 AM [facility] Lab   O2 Content, Arterial 17.7  Not Established mL/dL Final 09/20/2018  4:25 AM [facility] Lab   O2 Therapy Unknown          Right radial  AT+  VC 12 500 50%, +6 PEEP

## 2018-09-20 NOTE — PROGRESS NOTES
Dr. Mila Fuller and Dr. Laura Brunner notified of patient's SBP in 50's-60's. NS bolus and levophed ordered. Dr. Mila Fuller and Dr. Jania Villegas (for Dr. Salvador Altman) notified of temporal temperature of 101.7. Tylenol suppository ordered. Patient continues to have a gag reflex and localizes pain, but will no longer nod his head to questions or  my hands. GCS 7.

## 2018-09-20 NOTE — PROGRESS NOTES
hydronephrosis. Right kidney = 9.6 x 4.9 x 4.9 cm. Cortical thickness = 1.3-1.4 cm. Left kidney = 10.9 x 5.1 x 5.1 cm. Cortical thickness = 1.2-1.7 cm. Bladder catheter present. The urinary bladder is decompressed and not well visualized. No free pelvic fluid is seen. 1. No abnormality is seen. Signed by Dr Wendi Ortiz on 9/14/2018 1:52 PM    Xr Chest Portable    Result Date: 9/20/2018  XR CHEST PORTABLE 9/20/2018 10:45 PM HISTORY: Intubated Comparison: 9/19/2018 Findings: Lines and tubes are stable in position. No new opacities or pneumothoraces are visualized in the chest. The cardiomediastinal silhouette and pulmonary vascularity are unchanged. No acute osseous or soft tissue abnormality is noted. Impression: 1. No significant interval change since previous exam. Signed by Dr Clarissa Love on 9/20/2018 7:38 AM    Xr Chest Portable    Result Date: 9/19/2018  XR CHEST PORTABLE 9/19/2018 10:30 AM HISTORY: PIC line placement Comparison: 9/19/2018 at 7:49 AM Findings: The tip of the left upper extremity PICC extends to the atriocaval junction. Other lines and tubes are stable. Diffuse opacities in bilateral lungs have slightly increased. The cardiomediastinal silhouette and pulmonary vascularity are unchanged. No acute osseous or soft tissue abnormality is noted. Impression: 1. Satisfactory position of the PICC line. 2. Increasing, diffuse airspace opacities. Signed by Dr Clarissa Love on 9/19/2018 11:50 AM    Xr Chest Portable    Result Date: 9/19/2018  History: 80-year-old with OG placement. Reference: Chest radiograph earlier today. Findings: Frontal chest radiograph performed. Heart and mediastinum unchanged. Pulmonary opacities are unchanged. ET tube remains in good position. The weighted feeding tube is been replaced with an OG tube, tip at expected gastric antrum/duodenal bulb. Satisfactory OG tube.  Signed by Dr Ignacia Ayala on 9/19/2018 7:57 AM    Xr Chest Portable    Result Date: 9/19/2018  History: 80year-old with intubation. Reference: Chest radiograph one day prior. Findings: Frontal chest radiograph performed. Diffuse patchy opacities more severe in the perihilar regions. No significant change from yesterday. Heart and mediastinum unchanged. No pleural effusion or pneumothorax. Patient has been intubated, ET tube 5 cm above the arnold. Weighted feeding tube, tip in gastric fundus, unchanged. Satisfactory ET tube. Persistent airspace opacities, edema and/or pneumonia. Signed by Dr Kristy Da Silva on 9/19/2018 7:37 AM    Xr Chest Portable    Result Date: 9/18/2018  EXAMINATION: XR CHEST PORTABLE 9/18/2018 9:42 PM HISTORY: Shortness of breath COMPARISON: 9/18/2018 at 2:45 PM FINDINGS: Heart and mediastinal contours are stable. A Dobbhoff tube is again seen with tip overlying the left upper quadrant, presumably in the stomach. Bilateral interstitial and alveolar opacities are again seen, predominately in the perihilar regions. There is no appreciable pneumothorax or definite pleural effusion. No significant interval change in appearance of the chest. Bilateral interstitial opacities are noted, possibly reflecting pulmonary edema or an infectious process. Correlate clinically. Signed by Dr Efrain Love on 9/18/2018 9:44 PM    Xr Chest Portable    Result Date: 9/18/2018  XR CHEST PORTABLE 9/18/2018 1:45 PM HISTORY: Dobbhoff placement Comparison: 9/18/2018 at 10:20am. Findings: The Dobbhoff tube projects into the stomach. No new opacities or pneumothoraces are visualized in the chest. The cardiomediastinal silhouette and pulmonary vascularity are unchanged. No acute osseous or soft tissue abnormality is noted. Impression: 1. No significant interval change in diffuse airspace opacities. 2. The Dobbhoff tube is in the stomach. Signed by Dr Ny Rubio on 9/18/2018 3:24 PM    Xr Chest Portable    Result Date: 9/18/2018  History: 80year-old to follow-up infiltrates. Hypoxia.  Reference:

## 2018-09-20 NOTE — PROGRESS NOTES
(LANTUS) injection vial 15 Units  15 Units Subcutaneous Nightly Adria Thomas MD   15 Units at 09/19/18 2218    vancomycin (VANCOCIN) intermittent dosing (placeholder)   Other RX Jayden Oneill MD        diltiazem (CARDIZEM) tablet 30 mg  30 mg Per NG tube 4 times per day Nubia Mishra MD   30 mg at 09/20/18 1137    azithromycin (ZITHROMAX) 500 mg in D5W 250ml Vial Mate  500 mg Intravenous Q24H Virl MD Melanie   Stopped at 09/19/18 1942    norepinephrine (LEVOPHED) 16 mg in dextrose 5 % 250 mL infusion  2 mcg/min Intravenous Continuous Sigifredo Parsons MD 5.6 mL/hr at 09/20/18 0530 6 mcg/min at 09/20/18 0530    acetaminophen (TYLENOL) suppository 650 mg  650 mg Rectal Q6H PRN Adria Thomas MD   650 mg at 09/19/18 2220    enoxaparin (LOVENOX) injection 60 mg  1 mg/kg Subcutaneous Q24H Nubia Mishra MD   60 mg at 09/19/18 2239    insulin lispro (HUMALOG) injection vial 0-12 Units  0-12 Units Subcutaneous TID WC Adria Thomas MD   4 Units at 09/20/18 1140    insulin lispro (HUMALOG) injection vial 0-6 Units  0-6 Units Subcutaneous Nightly Adria Thomas MD   2 Units at 09/19/18 2218    dextrose 5 % with KCl 20 mEq infusion   Intravenous Continuous Khari Lanier  mL/hr at 09/20/18 0152 100 mL/hr at 09/20/18 0152    ipratropium-albuterol (DUONEB) nebulizer solution 1 ampule  1 ampule Inhalation 4x daily Maksim Vásquez MD   1 ampule at 09/20/18 1042    acetylcysteine (MUCOMYST) 20 % solution 600 mg  600 mg Inhalation BID ANIKA García   800 mg at 09/20/18 5519    acetaminophen (TYLENOL) tablet 650 mg  650 mg Oral Q4H PRN Urvashi Reynolds MD        magnesium hydroxide (MILK OF MAGNESIA) 400 MG/5ML suspension 30 mL  30 mL Oral Daily PRN Urvashi Reynolds MD        propranolol (INDERAL) tablet 40 mg  40 mg Oral BID Urvashi Reynolds MD   40 mg at 09/19/18 1739    glucose (GLUTOSE) 40 % oral gel 15 g  15 g Oral PRN Maksim Vásquez MD        dextrose 50 %

## 2018-09-21 NOTE — PROGRESS NOTES
Infectious Diseases Progress Note    Patient:  Ellen Lee  YOB: 1933  MRN: [id number]   Admit date: 9/11/2018   Admitting Physician: Rossana Sharp MD  Primary Care Physician: No primary care provider on file. Chief Complaint/Interval History:  He remains on the ventilator. He remains on Levophed. He has had low-grade temperature. At times his blood pressure has been high 80s or low 39X systolic on Levophed and at other times 130s. No new findings. His friend of many years is at his bedside. She doesn't think he would want overly aggressive treatment ongoing. She indicates some daughters are coming to [place] this weekend and she feels they have a similar feeling.     In/Out    Intake/Output Summary (Last 24 hours) at 09/21/18 0656  Last data filed at 09/21/18 0600   Gross per 24 hour   Intake           3384.4 ml   Output             1415 ml   Net           1969.4 ml     Allergies: No Known Allergies  Current Meds:   meropenem (MERREM) 1 g in sodium chloride 0.9 % 100 mL IVPB (mini-bag) Q12H   enoxaparin (LOVENOX) injection 60 mg BID   famotidine (PEPCID) injection 20 mg Daily   propranolol (INDERAL) tablet 40 mg BID PRN   propofol 1000 MG/100ML injection Titrated   morphine injection 2 mg Q2H PRN   LORazepam (ATIVAN) injection 2 mg Q2H PRN   insulin glargine (LANTUS) injection vial 15 Units Nightly   vancomycin (VANCOCIN) intermittent dosing (placeholder) RX Placeholder   diltiazem (CARDIZEM) tablet 30 mg 4 times per day   azithromycin (ZITHROMAX) 500 mg in D5W 250ml Vial Mate Q24H   norepinephrine (LEVOPHED) 16 mg in dextrose 5 % 250 mL infusion Continuous   acetaminophen (TYLENOL) suppository 650 mg Q6H PRN   insulin lispro (HUMALOG) injection vial 0-12 Units TID WC   insulin lispro (HUMALOG) injection vial 0-6 Units Nightly   ipratropium-albuterol (DUONEB) nebulizer solution 1 ampule 4x daily   acetylcysteine (MUCOMYST) 20 % solution 600 mg BID   acetaminophen (TYLENOL) tablet 650 mg Q4H PRN magnesium hydroxide (MILK OF MAGNESIA) 400 MG/5ML suspension 30 mL Daily PRN   glucose (GLUTOSE) 40 % oral gel 15 g PRN   dextrose 50 % solution 12.5 g PRN   glucagon (rDNA) injection 1 mg PRN   dextrose 5 % solution PRN     ROS unobtainable from patient    Vital Signs:  BP (!) 90/42   Pulse 92   Temp 99.1 °F (37.3 °C) (Axillary)   Resp 24   Ht 5' 6\" (1.676 m)   Wt 137 lb 4.8 oz (62.3 kg)   SpO2 97%   BMI 22.16 kg/m²     Physical Exam   Heart without murmur  Lungs with rare scattered crackle  Abdomen nondistended. Hypoactive bowel sounds. Skin without drug rash  Line/IV site: No erythema, warmth, induration, or tenderness. Lab Results:  CBC:   Recent Labs      09/19/18   0132 09/20/18   0122 09/21/18   0300   WBC  38.2*  42.1*  40.0*   HGB  14.5  12.7*  12.5*   PLT  161  135  129*     BMP:  Recent Labs      09/19/18   0132   09/20/18   0122  09/20/18   0425  09/21/18   0300  09/21/18   0439   NA  147*   --   141   --   137   --    K  4.0   < >  4.4  4.3  4.8  4.6   CL  109   --   106   --   104   --    CO2  23   --   24   --   25   --    BUN  80*   --   87*   --   78*   --    CREATININE  1.7*   --   2.0*   --   1.4*   --    GLUCOSE  318*   --   322*   --   147*   --     < > = values in this interval not displayed. Culture Results:  Blood cultures ×2 on September 19, 2018-no growth     Radiology:   Chest x-ray today:  Narrative   History:   80year-old with intubation. Reference:   Chest radiograph one day prior. Findings:   Frontal chest radiograph performed. Heart and mediastinum unchanged. Persistent bilateral parahilar   opacities with dense consolidation in the right base. Pneumonia is   suspected. No improvement from yesterday. No new opacities. No pleural   effusion or pneumothorax. Left subclavian PICC, tip SVC. Endotracheal tube, tip 4 cm above the   arnold. NG tube terminates in distal stomach.       Impression   No interval improvement.    Signed by Dr Alondra Montero on 9/21/2018 7:42 AM     Additional Studies Reviewed:  None    Impression:  1. Respiratory failure  2. Suspected aspiration pneumonia  3. Recent psychosis  4. Diabetes mellitus  5.  Leukocytosis    Recommendations:  Continue broad antibiotic treatment with vancomycin, meropenem, and levofloxacin  Agree prognosis is poor given his age and organ dysfunction  No new recommendations at present      Jil Manzanares MD

## 2018-09-21 NOTE — PROGRESS NOTES
and 1926ml free water from formula and pump flush  · Additional Calories: Propofol 7ml = 184 NP kcals     · Anthropometric Measures:  · Ht: 5' 6\" (167.6 cm)   · Current Body Wt: 137 lb 4 oz (62.3 kg)  · Admission Body Wt: 135 lb (61.2 kg)  · % Weight Change: 6.6%,  4 days  · Ideal Body Wt: 142 lb (64.4 kg), % Ideal Body 88.8%  · Adjusted Body Wt:  , body weight adjusted for    · BMI Classification: BMI 18.5 - 24.9 Normal Weight     · Comparative Standards (Estimated Nutrition Needs):  · Estimated Daily Total Kcal: 6264-0164 kcals  · Estimated Daily Protein (g): 58-70g  · Estimated Daily Fluid (ml/day): 5160-0443 ml    Estimated Intake vs Estimated Needs: Intake Improving    Nutrition Risk Level: High    Nutrition Interventions:   Continue current Tube Feeding  Continued Inpatient Monitoring    Nutrition Evaluation:   · Evaluation: Progressing toward goals   · Goals: diet progression or nutrition support   · Monitoring: TF Intake, TF Tolerance, Gastric Residuals, Skin Integrity, Wound Healing, Fluid Balance, Weight, Pertinent Labs    See Adult Nutrition Doc Flowsheet for more detail.      Electronically signed by Rose Marie Barton, MS, RD, LD on 9/21/18 at 1:44 PM    Contact Number: [phone]

## 2018-09-21 NOTE — PROGRESS NOTES
ill-appearing white male who is intubated and sedated    HEENT: ETT in place  Eyes: Pupils are equal and reactive to light. Neck: No definite JVD noted. Chest: Coarse rales throughout, diminished, tachypnea and paradox noted  Cardiac: He has an irregular rhythm and is tachycardic. Abdomen: Soft. Extremities: He has trace edema of the calves. Musculoskeletal: No joint deformities noted. Dermatologic: No rash noted. Neurologic: He is sedated and intubated    Psychiatric: Cannot assess due to him being on the vent  Lymphatic: No adenopathy palpated.     Recent Labs      09/19/18   0132  09/20/18   0122  09/21/18   0300   WBC  38.2*  42.1*  40.0*   RBC  4.38*  3.79*  3.74*   HGB  14.5  12.7*  12.5*   HCT  44.6  38.0*  37.8*   PLT  161  135  129*   MCV  101.8*  100.3*  101.1*   MCH  33.1*  33.5*  33.4*   MCHC  32.5*  33.4  33.1   RDW  13.8  14.2  14.4   BANDSPCT  11*   --   5      Recent Labs      09/19/18   0132   09/19/18   0458  09/20/18   0122  09/20/18   0425  09/21/18   0300  09/21/18   0439   NA  147*   --    --   141   --   137   --    K  4.0   < >  4.1  4.4  4.3  4.8  4.6   CL  109   --    --   106   --   104   --    CO2  23   --    --   24   --   25   --    BUN  80*   --    --   87*   --   78*   --    CREATININE  1.7*   --    --   2.0*   --   1.4*   --    CALCIUM  8.3*   --    --   7.8*   --   8.1*   --    GLUCOSE  318*   --    --   322*   --   147*   --    PHART   --    < >  7.500*   --   7.440   --   7.420   [id number]   --    < >  33.0*   --   37.0   --   43.0   PO2ART   --    < >  148.0*   --   66.0*   --   67.0*   NSN4UII   --    < >  25.7   --   25.1   --   27.9*   [id number]   --    < >  97.1   --   93.7   --   94.0   BEART   --    < >  2.9*   --   1.1   --   3.0*   AST  38   --    --   57*   --    --    --    ALT  27   --    --   35   --    --    --    ALKPHOS  100   --    --   131*   --    --    --    BILITOT  1.4*   --    --   0.8   --    --    --    MG  2.4   --    --    --    --    --    --     < >

## 2018-09-21 NOTE — PROGRESS NOTES
Physical Therapy  [facility]  [id number]     09/21/18 1001   Subjective   Subjective Patient still intubated and sedated.    Electronically signed by Jasmyn Hayes PTA on 9/21/2018 at 10:02 AM

## 2018-09-21 NOTE — PROGRESS NOTES
Progress Note  Sravani Bach  9/21/2018 10:21 AM  Subjective:   Admit Date:   9/11/2018      CC/ADMIT DX:       Interval History:   Reviewed overnight events and nursing notes. He has had no new issues. I have reviewed all labs/diagnostics from the last 24hrs. ROS:   I have done a 10 point ROS and all are negative, except what is mentioned in the HPI. DIET TUBE FEED CONTINUOUS/CYCLIC NPO; Diabetic (glucerna 1.2); Nasogastric; Continuous; 30; 50; 24    Medications:      propofol 20 mcg/kg/min (09/21/18 0700)    norepinephrine 8 mcg/min (09/21/18 0636)    dextrose        meropenem  1 g Intravenous Q12H    enoxaparin  1 mg/kg Subcutaneous BID    famotidine (PEPCID) injection  20 mg Intravenous Daily    insulin glargine  15 Units Subcutaneous Nightly    vancomycin (VANCOCIN) intermittent dosing (placeholder)   Other RX Placeholder    diltiazem  30 mg Per NG tube 4 times per day    azithromycin  500 mg Intravenous Q24H    insulin lispro  0-12 Units Subcutaneous TID WC    insulin lispro  0-6 Units Subcutaneous Nightly    ipratropium-albuterol  1 ampule Inhalation 4x daily    acetylcysteine  600 mg Inhalation BID           Objective:   Vitals: BP (!) 115/39   Pulse 103   Temp 99 °F (37.2 °C) (Temporal)   Resp 25   Ht 5' 6\" (1.676 m)   Wt 137 lb 4.8 oz (62.3 kg)   SpO2 96%   BMI 22.16 kg/m²      Intake/Output Summary (Last 24 hours) at 09/21/18 1021  Last data filed at 09/21/18 0800   Gross per 24 hour   Intake          3003.36 ml   Output             1290 ml   Net          1713.36 ml     General appearance: on vent  Lungs: coarse throughout  Heart: RRR  Abdomen: soft, non-tender; bowel sounds normal; no masses,  no organomegaly  Extremities: extremities normal, atraumatic, no cyanosis or edema  Neurologic:  sedated    Assessment and Plan:   Acute Resp Failure with Hypoxia  Probable Aspiration Pneumonia  Psychosis  Adult FTT  DM2  MIRACLE  New onset A Fib  Leukocytosis    Plan:  1.   Continue present medication(s)   2. Follow with other Cardiology, Nephrology, and Pulm  3. Continue TF  4. Monitor labs  5. Will defer diuresis, and cardiac medicine to Cardiology  6. Continue basal insulin and SSI  7. PT      Discharge planning:   Home  Vs [facility]    Reviewed treatment plans with Nursing.           Electronically signed by Whit Cordero MD on 9/21/2018 at 10:21 AM

## 2018-09-21 NOTE — PROGRESS NOTES
Pharmacy Renal Adjustment    Bari Garcia is a 80 y.o. male. Pharmacy has renally adjusted medications per protocol. Recent Labs      09/20/18   0122  09/21/18   0300   BUN  87*  78*       Recent Labs      09/20/18   0122  09/21/18   0300   CREATININE  2.0*  1.4*       Estimated Creatinine Clearance: 33 mL/min (A) (based on SCr of 1.4 mg/dL (H)).     Height:   Ht Readings from Last 1 Encounters:   09/13/18 5' 6\" (1.676 m)     Weight:  Wt Readings from Last 1 Encounters:   09/20/18 135 lb (61.2 kg)       Plan: Adjust the following medications based on renal function:           Famotidine 20 mg IV twice daily to 20 mg IV once daily    Electronically signed by Pamela Swanson, [address] [place] [address] on 9/21/2018 at 4:24 AM

## 2018-09-22 PROBLEM — E11.9 TYPE 2 DIABETES MELLITUS (HCC): Status: ACTIVE | Noted: 2018-01-01

## 2018-09-22 PROBLEM — N17.9 AKI (ACUTE KIDNEY INJURY) (HCC): Status: ACTIVE | Noted: 2018-01-01

## 2018-09-22 PROBLEM — E46 PROTEIN MALNUTRITION (HCC): Status: ACTIVE | Noted: 2018-01-01

## 2018-09-22 PROBLEM — J69.0 ASPIRATION PNEUMONIA (HCC): Status: ACTIVE | Noted: 2018-01-01

## 2018-09-22 NOTE — PROGRESS NOTES
Pulmonary and Critical Care Progress Note [address] [facility]    Patient: Brady Moyer  1/9/1933   MR# [id number]   Acct# [de-identified]  09/22/18   10:07 AM  Referring Provider: Kianna Lovett MD  Problem list:   Patient Active Problem List   Diagnosis    Psychotic disorder with hallucinations    Acute respiratory failure with hypoxia ([address]    Atrial fibrillation by electrocardiogram ([address]    Dysphagia    Cough    Pulmonary edema    Hypernatremia    Hypoxia     Chief Complaint: Hypoxemia    Interval history: Patient remains intubated and sedated. He is still requiring levophed. Propofol infusing. RN states he has no purposeful movement with sedation vacation. Still receiving nutrition. CXR unchanged. ABGs stable on PEEP 7 and 50%. Unable to decrease ventilatory support. No family at the bedside. No other aggravating or alleviating factors or associated symptoms. vancomycin 1,000 mg Intravenous Once   meropenem 1 g Intravenous Q12H   enoxaparin 1 mg/kg Subcutaneous BID   famotidine (PEPCID) injection 20 mg Intravenous Daily   insulin glargine 15 Units Subcutaneous Nightly   vancomycin (VANCOCIN) intermittent dosing (placeholder)  Other RX Placeholder   diltiazem 30 mg Per NG tube 4 times per day   azithromycin 500 mg Intravenous Q24H   insulin lispro 0-12 Units Subcutaneous TID WC   insulin lispro 0-6 Units Subcutaneous Nightly   ipratropium-albuterol 1 ampule Inhalation 4x daily   acetylcysteine 600 mg Inhalation BID      propofol 18 mcg/kg/min (09/22/18 0928)    norepinephrine 5 mcg/min (09/22/18 0928)    dextrose       Review of Systems:   ROS   Unobtainable due to him being on the vent. Physical Exam:  Blood pressure (!) 120/36, pulse 104, temperature 98.9 °F (37.2 °C), temperature source Temporal, resp. rate 21, height 5' 6\" (1.676 m), weight 140 lb 9.6 oz (63.8 kg), SpO2 100 %.     Intake/Output Summary (Last 24 hours) at 09/22/18 1007  Last data filed at 09/22/18 0815   Gross per 24 hour

## 2018-09-22 NOTE — PROGRESS NOTES
at 09/22/18 0928 18 mcg/kg/min at 09/22/18 7686    morphine injection 2 mg  2 mg Intravenous Q2H PRN Omar Morales MD   2 mg at 09/19/18 0545    LORazepam (ATIVAN) injection 2 mg  2 mg Intravenous Q2H PRN Omar Morales MD        insulin glargine (LANTUS) injection vial 15 Units  15 Units Subcutaneous Nightly Adria Thomas MD   15 Units at 09/21/18 2043    vancomycin (VANCOCIN) intermittent dosing (placeholder)   Other RX Placeholder Adria Thomas MD        diltiazem (CARDIZEM) tablet 30 mg  30 mg Per NG tube 4 times per day Nubia Mishra MD   30 mg at 09/22/18 1148    azithromycin (ZITHROMAX) 500 mg in D5W 250ml Vial Mate  500 mg Intravenous Q24H Jil Manzanares MD   Stopped at 09/21/18 1943    norepinephrine (LEVOPHED) 16 mg in dextrose 5 % 250 mL infusion  2 mcg/min Intravenous Continuous Sigifredo Parsons MD 4.7 mL/hr at 09/22/18 0928 5 mcg/min at 09/22/18 0928    acetaminophen (TYLENOL) suppository 650 mg  650 mg Rectal Q6H PRN Adria Thomas MD   650 mg at 09/20/18 1839    insulin lispro (HUMALOG) injection vial 0-12 Units  0-12 Units Subcutaneous TID WC Adria Thomas MD   2 Units at 09/22/18 9986    insulin lispro (HUMALOG) injection vial 0-6 Units  0-6 Units Subcutaneous Nightly Adria Thomas MD   3 Units at 09/20/18 2041    ipratropium-albuterol (DUONEB) nebulizer solution 1 ampule  1 ampule Inhalation 4x daily Maksim Vásquez MD   1 ampule at 09/22/18 1003    acetylcysteine (MUCOMYST) 20 % solution 600 mg  600 mg Inhalation BID ANIKA García        acetaminophen (TYLENOL) tablet 650 mg  650 mg Oral Q4H PRN Urvashi Reynolds MD   650 mg at 09/21/18 0517    magnesium hydroxide (MILK OF MAGNESIA) 400 MG/5ML suspension 30 mL  30 mL Oral Daily PRN Urvashi Reynolds MD        glucose (GLUTOSE) 40 % oral gel 15 g  15 g Oral PRN Maksim Vásquez MD        dextrose 50 % solution 12.5 g  12.5 g Intravenous PRN Maksim Vásquez MD        glucagon (rDNA) injection 1 mg  1 mg 09/11/2018. Interstitial prominence and central vascular prominence compatible with early CHF and developing pulmonary edema. The upper lobes are clear. There is no pneumothorax. Heart size is normal.    1. Increased interstitial and vascular prominence compatible with CHF. Signed by Dr Cirilo Frederick on 9/12/2018 1:43 PM    Xr Chest Portable    Result Date: 9/11/2018  XR CHEST PORTABLE 9/11/2018 5:00 PM HISTORY: Shortness of breath COMPARISON: 9/11/2018 at 12:26 PM. FINDINGS: Frontal view of the chest was obtained. Diffuse opacities are now seen in bilateral lungs, greater on the left. The cardiomediastinal silhouette and pulmonary vascularity are unchanged. The osseous structures and surrounding soft tissues demonstrate no acute abnormality. 1. Interval development of bilateral airspace opacities, much greater on the left. Findings could be due to pulmonary edema. Pneumonia is considered less likely given the abrupt interval appearance. Signed by Dr Franco Fitch on 9/11/2018 6:27 PM    Xr Chest Portable    Result Date: 9/11/2018  XR CHEST PORTABLE 9/11/2018 1:08 PM History: Productive cough. Low O2 saturation. Portable chest x-ray with no comparison. The heart size is normal. The mediastinum is within normal limits. The lungs are normally expanded with no pneumonia or pneumothorax. No congestive failure changes. 1. No acute disease.  Signed by Dr Cirilo Frederick on 9/11/2018 1:08 PM       Assessment   MIRACLE / obstructive uropathy - improved / ATN  BPH with obstruction  Aspiration PNA  Hypernatremia  Hypokalemia  AFib    Plan:  D/w RN/pulmonary/Dr. Ирина Alcala  Transitioned D5 to FW with Tube feeds as free water deficit corrected, FW rate adjusted  Creat improved  Wean levophed as tolerated     Colette Cagle MD  09/22/18  12:49 PM

## 2018-09-22 NOTE — PROGRESS NOTES
Pharmacy Vancomycin Consult     Vancomycin Day: 4  Current Dosing: Pulse dosing    Temp max:  99.2    Recent Labs      09/21/18   0300  09/22/18   0430   BUN  78*  75*       Recent Labs      09/21/18   0300  09/22/18   0430   CREATININE  1.4*  1.2       Recent Labs      09/21/18   0300  09/22/18   0430   WBC  40.0*  31.5*         Intake/Output Summary (Last 24 hours) at 09/22/18 9870  Last data filed at 09/22/18 0815   Gross per 24 hour   Intake 2273.23 ml   Output 1460 ml   Net 813.23 ml       Culture Date Source Results   09/19/18 Blood x 2 No growth   09/19/18 Respiratory C. albicans            Ht Readings from Last 1 Encounters:   09/13/18 5' 6\" (1.676 m)        Wt Readings from Last 1 Encounters:   09/22/18 140 lb 9.6 oz (63.8 kg)         Body mass index is 22.69 kg/m². Estimated Creatinine Clearance: 41 mL/min (based on SCr of 1.2 mg/dL). Random: 15.5    Assessment/Plan: Level good. Give Vancomycin 1 gram today. Level ordered for 09/23/18.     Electronically signed by RADHA Capps [facility] on 9/22/2018 at 9:52 AM

## 2018-09-22 NOTE — PROGRESS NOTES
Medicine Progress Note 9/22/2018    Patient:  Ellen Lee  YOB: 1933  MRN: [id number]     Acct: [de-identified]   Admit date: 9/11/2018    Patient Seen, Chart, Consults notes, Labs, Radiology studies reviewed. Subjective:   Remains sedated, on vent & pressor support. Has had issues with continuous liquid diarrhea. No fever. No blood in stool. Step-daughter and friend at bedside report patient has daughter on way from [place] (expected tomm afternoon) who is POA. Also with daughter in longterm care at [facility], distant son who has called to check in but will not be able to come until next week, and estranged daughter.         Medications:   Scheduled Meds:   meropenem  1 g Intravenous Q12H    enoxaparin  1 mg/kg Subcutaneous BID    famotidine (PEPCID) injection  20 mg Intravenous Daily    insulin glargine  15 Units Subcutaneous Nightly    vancomycin (VANCOCIN) intermittent dosing (placeholder)   Other RX Placeholder    diltiazem  30 mg Per NG tube 4 times per day    azithromycin  500 mg Intravenous Q24H    insulin lispro  0-12 Units Subcutaneous TID WC    insulin lispro  0-6 Units Subcutaneous Nightly    ipratropium-albuterol  1 ampule Inhalation 4x daily    acetylcysteine  600 mg Inhalation BID     Continuous Infusions:   propofol 18 mcg/kg/min (09/22/18 0928)    norepinephrine 5 mcg/min (09/22/18 0928)    dextrose       PRN Meds:propranolol, morphine, LORazepam, acetaminophen, acetaminophen, magnesium hydroxide, glucose, dextrose, glucagon (rDNA), dextrose    Objective:   Vitals: Patient Vitals for the past 24 hrs:   BP Temp Temp src Pulse Resp SpO2 Weight   09/22/18 1200 (!) 114/40 99.5 °F (37.5 °C) Temporal 97 26 95 % -   09/22/18 1101 (!) 103/32 - - 116 27 94 % -   09/22/18 1003 - - - - 21 100 % -   09/22/18 1002 (!) 104/42 - - 100 27 100 % -   09/22/18 0958 - - - 104 28 99 % -   09/22/18 0900 (!) 120/36 - - 101 24 97 % -   09/22/18 0800 (!) 127/53 98.9 °F (37.2 °C) Temporal 97 25 97 %

## 2018-09-22 NOTE — PROGRESS NOTES
Q4H PRN   magnesium hydroxide (MILK OF MAGNESIA) 400 MG/5ML suspension 30 mL Daily PRN   glucose (GLUTOSE) 40 % oral gel 15 g PRN   dextrose 50 % solution 12.5 g PRN   glucagon (rDNA) injection 1 mg PRN   dextrose 5 % solution PRN     ROS unobtainable from patient    Vital Signs:  BP (!) 127/53   Pulse 97   Temp 99 °F (37.2 °C) (Temporal)   Resp 25   Ht 5' 6\" (1.676 m)   Wt 140 lb 9.6 oz (63.8 kg)   SpO2 97%   BMI 22.69 kg/m²     Physical Exam   Lungs with rare coarse crackles  Heart without murmur  Abdomen without distention  Skin without rash  Line/IV site: No erythema, warmth, induration, or tenderness. Lab Results:  CBC:   Recent Labs      09/20/18   0122 09/21/18   0300  09/22/18   0430   WBC  42.1*  40.0*  31.5*   HGB  12.7*  12.5*  11.5*   PLT  135  129*  150     BMP:  Recent Labs      09/20/18   0122 09/21/18   0300  09/21/18   0439  09/22/18   0430  09/22/18   0455   NA  141   --   137   --   136   --    K  4.4   < >  4.8  4.6  4.9  4.9   CL  106   --   104   --   101   --    CO2  24   --   25   --   27   --    BUN  87*   --   78*   --   75*   --    CREATININE  2.0*   --   1.4*   --   1.2   --    GLUCOSE  322*   --   147*   --   161*   --     < > = values in this interval not displayed. Culture Results:  Blood cultures 9/19 - no growth    Radiology:  Chest x-ray today:  Impression   1. Stable satisfactory position of multiple life support lines. 2. Bilateral pulmonary infiltrates and consolidation of the right   lower lobe compatible with pneumonia, unchanged. Signed by Dr Eric Soto. Amando on 9/22/2018 9:56 AM     Impression:  1. Respiratory failure  2. Probable aspiration pneumonia  3. Leukocytosis showing some slight improvement  4. Recent psychosis  5.  Diabetes mellitus    Recommendations:  Feel we should continue to treat him broadly for possible associated aspiration pneumonia  Feel current treatment with vancomycin/meropenem/azithromycin appropriate  Encourage nursing to

## 2018-09-23 NOTE — PROGRESS NOTES
Medicine Progress Note 9/23/2018    Patient:  Bari Garcia  YOB: 1933  MRN: [id number]     Acct: [de-identified]   Admit date: 9/11/2018    Patient Seen, Chart, Consults notes, Labs, Radiology studies reviewed. Subjective:   Remains on vent, able to wean off sedation & pressor support. No fever. Seems comfortable. Spoke with friend at bedside. Await arrival of daughter on way from [place] (expected this afternoon) who is POA.       Medications:   Scheduled Meds:   miconazole   Topical BID    meropenem  1 g Intravenous Q12H    enoxaparin  1 mg/kg Subcutaneous BID    famotidine (PEPCID) injection  20 mg Intravenous Daily    insulin glargine  15 Units Subcutaneous Nightly    vancomycin (VANCOCIN) intermittent dosing (placeholder)   Other RX Placeholder    diltiazem  30 mg Per NG tube 4 times per day    azithromycin  500 mg Intravenous Q24H    insulin lispro  0-12 Units Subcutaneous TID WC    insulin lispro  0-6 Units Subcutaneous Nightly    ipratropium-albuterol  1 ampule Inhalation 4x daily    acetylcysteine  600 mg Inhalation BID     Continuous Infusions:   propofol Stopped (09/22/18 2219)    norepinephrine Stopped (09/23/18 0147)    dextrose       PRN Meds:propranolol, morphine, LORazepam, acetaminophen, acetaminophen, magnesium hydroxide, glucose, dextrose, glucagon (rDNA), dextrose    Objective:   Vitals:   Patient Vitals for the past 24 hrs:   BP Temp Temp src Pulse Resp SpO2 Weight   09/23/18 1018 - - - - 21 99 % -   09/23/18 1005 - - - - 26 98 % -   09/23/18 1000 (!) 104/47 - - 90 25 98 % -   09/23/18 0958 - - - 81 25 98 % -   09/23/18 0945 (!) 85/40 - - 88 25 98 % -   09/23/18 0900 (!) 104/37 - - 91 27 98 % -   09/23/18 0800 (!) 116/39 - - 91 22 97 % -   09/23/18 0730 (!) 107/33 - - 94 26 97 % -   09/23/18 0715 (!) 114/36 - - 89 27 98 % -   09/23/18 0700 (!) 109/46 - - 98 27 96 % -   09/23/18 0630 (!) 106/38 - - 86 25 96 % -   09/23/18 0607 - - - 85 26 98 % -   09/23/18 0600 (!) 91/38 - - 90 25 97 % -   09/23/18 0500 (!) 101/41 - - 89 27 97 % -   09/23/18 0400 (!) 91/30 98.9 °F (37.2 °C) Temporal 84 27 95 % 143 lb 4.8 oz (65 kg)   09/23/18 0302 - - - 93 29 97 % -   09/23/18 0300 (!) 104/41 - - 90 29 97 % -   09/23/18 0200 (!) 89/44 - - 89 25 97 % -   09/23/18 0100 (!) 106/36 - - 101 29 96 % -   09/23/18 0000 (!) 91/36 98.7 °F (37.1 °C) Temporal 96 (!) 31 96 % -   09/22/18 2348 - - - 99 26 96 % -   09/22/18 2300 (!) 121/51 - - 90 29 96 % -   09/22/18 2205 - - - 102 28 97 % -   09/22/18 2200 (!) 128/36 - - 108 28 96 % -   09/22/18 2100 (!) 112/39 - - 95 27 96 % -   09/22/18 2000 (!) 116/39 98.6 °F (37 °C) Temporal 94 27 94 % -   09/22/18 1900 (!) 93/37 - - 92 21 95 % -   09/22/18 1839 - - - 89 25 93 % -   09/22/18 1800 (!) 115/43 - - 116 28 93 % -   09/22/18 1700 (!) 112/46 - - 109 28 92 % -   09/22/18 1600 (!) 84/39 99.8 °F (37.7 °C) Temporal 95 28 90 % -   09/22/18 1402 - - - - 23 97 % -   09/22/18 1400 - - - 96 28 96 % -   09/22/18 1300 (!) 110/32 - - 92 28 96 % -   09/22/18 1200 (!) 114/40 99.5 °F (37.5 °C) Temporal 97 26 95 % -   09/22/18 1101 (!) 103/32 - - 116 27 94 % -     GENERAL: Asleep, intubated, in no acute distress. CARDIAC: Irreg irregular rate and rhythm. No murmurs, rubs, or gallops. RESPIRATORY: Chest is diminished bilaterally. B rhonchi. ABDOMEN: Normoactive bowel sounds. Abdomen soft, nontender, and nondistended. EXTREMITIES: No cyanosis, clubbing, or edema. 24 hour intake/output:    Intake/Output Summary (Last 24 hours) at 09/23/18 1024  Last data filed at 09/23/18 0901   Gross per 24 hour   Intake          2936.56 ml   Output             1930 ml   Net          1006.56 ml     Last 3 weights:   Wt Readings from Last 3 Encounters:   09/23/18 143 lb 4.8 oz (65 kg)       Labs:  Hematology:  Recent Labs      09/21/18   0300  09/22/18   0430  09/23/18   0440   WBC  40.0*  31.5*  28.9*   HGB  12.5*  11.5*  10.9*   HCT  37.8*  34.3*  32.6*   PLT  129*  150  198 respiratory failure with hypoxia ([facility] [address] [J96.01] 09/12/2018     Plan goals of care discussion efforts given poor prognosis. Continue nutritional support with TFs. Continue vent support at this time. Wean as lisset. Continue broad spectrum ABx per ID. Monitor cultures. De-escalate as able. Follow up consultant recs.     Electronically signed by Abram Horne MD on 9/23/2018 at 10:24 AM

## 2018-09-23 NOTE — PROGRESS NOTES
--   29.9*   [id number]   --    --   94.0   --    --   93.9   --   95.8   BEART   --    --   3.0*   --    --   4.3*   --   4.9*   TROPONINI   --    --    --   0.05*   --    --    --    --    LACTA   --    --    --   1.6   --    --    --    --     < > = values in this interval not displayed. No results for input(s): BC, LABGRAM, CULTRESP, BFCX in the last 72 hours. Radiograph:  EXAMINATION: XR CHEST PORTABLE 9/23/2018 8:44 AM   HISTORY: Pneumonia, respiratory failure. Report: Comparison is made with the chest x-ray 9/22/2018 0507 hours. The endotracheal tube, nasogastric tube and left-sided PICC appear in   good position as before. There is consolidation at the right lung base   which is unchanged. Moderate diffuse central interstitial infiltrates   also appear stable. Heart size is normal. No pneumothorax or pleural   effusion is identified.       Impression   Impression: Stable 1 day appearance of the chest.    Signed by Dr Geraldine Mathews on 9/23/2018 8:45 AM       My radiograph interpretation: ETT in place, persistent interstitial infiltrates     Pulmonary Assessment:  1. Acute hypoxemic respiratory failure due to pulmonary edema. 2. Volume overload  3. Aspiration pneumonia  4. Hypernatremia  5. Poor oral intake due to dysphagia  6. Atrial fibrillation with RVR  7. Encephalopathy  8. Leukocytosis, much worse  9. MIRACLE  10. Hypokalemia    Recommend:   · Continue current vent management. No plans to wean in the near future  · CXR/ABG daily while on the vent  · Pressors have been weaned, sedation is off. Patient unresponsive   · abx per ID   · Continue bronchodilators and mucolytics   · Prognosis very poor at this point. No family at the bedside     Electronically signed by Lucrecia Rudolph on 9/23/2018 at 11:58 AM     Physician's substantive portion:  Subjective: The patient remains intubated on mechanical ventilatory support.   Objective: He has coarse rales on chest exam with fair air movement bilaterally. Assessment/recommendation: We will continue ventilatory support pending further decisions from his daughter who is his power of  regarding end-of-life issues. I have seen and examined patient personally, performing a face-to-face diagnostic evaluation with plan of care reviewed and developed with APRN. I have addended and/or modified the above history of present illness, physical examination, and assessment and plan to reflect my findings and impressions. Essential elements of the care plan were discussed with APRN above. Agree with findings and assessment/plan as documented above.     Electronically signed by Kristi Escalera MD on 9/23/18 at 4:22 PM

## 2018-09-23 NOTE — PROGRESS NOTES
Spoke with Tessa DONALDSON regarding elevated potassium. Verbal orders obtained for kayexalate.  Will continue to monitor

## 2018-09-23 NOTE — PROGRESS NOTES
137   --   136   --   133*   --    K  4.8   < >  4.9  4.9  6.0*  5.9   CL  104   --   101   --   97*   --    CO2  25   --   27   --   29   --    BUN  78*   --   75*   --   84*   --    CREATININE  1.4*   --   1.2   --   1.3*   --    CALCIUM  8.1*   --   7.6*   --   7.8*   --     < > = values in this interval not displayed. CBC:   Recent Labs      09/21/18   0300  09/22/18   0430  09/23/18   0440   WBC  40.0*  31.5*  28.9*   HGB  12.5*  11.5*  10.9*   HCT  37.8*  34.3*  32.6*   MCV  101.1*  98.8*  99.7*   PLT  129*  150  198     LIVER PROFILE:   No results for input(s): AST, ALT, LIPASE, BILIDIR, BILITOT, ALKPHOS in the last 72 hours. Invalid input(s): AMYLASE,  ALB  PT/INR: No results for input(s): PROTIME, INR in the last 72 hours. APTT: No results for input(s): APTT in the last 72 hours. BNP:  No results for input(s): BNP in the last 72 hours. Ionized Calcium:No results for input(s): IONCA in the last 72 hours. Magnesium:  No results for input(s): MG in the last 72 hours. Phosphorus:  No results for input(s): PHOS in the last 72 hours. HgbA1C: No results for input(s): LABA1C in the last 72 hours. Hepatic:   No results for input(s): ALKPHOS, ALT, AST, PROT, BILITOT, BILIDIR, LABALBU in the last 72 hours. Lactic Acid:   Recent Labs      09/21/18   1310   LACTA  1.6     Troponin: No results for input(s): CKTOTAL, CKMB, TROPONINT in the last 72 hours. ABGs:   Lab Results   Component Value Date    PHART 7.430 09/23/2018    PO2ART 86.0 09/23/2018    KNB4VDI 45.0 09/23/2018     CRP:  No results for input(s): CRP in the last 72 hours. Sed Rate:  No results for input(s): SEDRATE in the last 72 hours. Culture Results:   Blood Culture Recent:   Recent Labs      09/19/18   0636   BC  No Growth to date. Any change in status will be called. Urine Culture Recent : No results for input(s): LABURIN in the last 720 hours.     Radiology reports as per the Radiologist  Radiology: Us Renal Complete    Result

## 2018-09-23 NOTE — PROGRESS NOTES
Abby Mccarty, Pt daughter/POA arrived from [place], updated on pt condition, sitting at pt bedside at this time  Electronically signed by Jus Ramos RN on 9/23/2018 at 6:42 PM

## 2018-09-23 NOTE — PROGRESS NOTES
Family at bedside, awaiting arrival of POA/daughter. No changes from previous assessment. Vital signs stable. Cardiac rhythm: AF. No signs of pain or discomfort noted.  Monitoring  Electronically signed by Blanca Rosales RN on 9/23/2018 at 3:37 PM

## 2018-09-23 NOTE — PROGRESS NOTES
Complete bed bath and linen change completed. Oral and carmichael care done. Patient tolerated with no s/s of distress. Levophed and propofol continue to be off.  Will continue to monitor

## 2018-09-24 NOTE — PROGRESS NOTES
m), weight 143 lb 4.8 oz (65 kg), SpO2 99 %. Intake/Output Summary (Last 24 hours) at 09/24/18 0810  Last data filed at 09/24/18 9396   Gross per 24 hour   Intake             2031 ml   Output             1715 ml   Net              316 ml     Physical Exam  he is an elderly chronically ill-appearing white male who is intubated and unresponsive   HEENT: ETT in place, OG intact with TF  Eyes: Pupils are equal and reactive to light. Neck: No definite JVD noted. Chest: Coarse rales throughout, diminished  Cardiac: He has an irregular rhythm and is tachycardic. Abdomen: Soft. Extremities: He has trace edema of the calves. Musculoskeletal: No joint deformities noted. Dermatologic: No rash noted. Neurologic: He is unresponsive and intubated. No purposeful movements. Does not open his eyes to voice. Psychiatric: unresponsive. No sedation infusing   Lymphatic: No adenopathy palpated.     Recent Labs      09/22/18   0430  09/23/18   0440  09/24/18   0240   WBC  31.5*  28.9*  23.6*   RBC  3.47*  3.27*  3.00*   HGB  11.5*  10.9*  10.0*   HCT  34.3*  32.6*  29.8*   PLT  150  198  253   MCV  98.8*  99.7*  99.3*   MCH  33.1*  33.3*  33.3*   MCHC  33.5  33.4  33.6   RDW  14.3  14.6*  14.6*      Recent Labs      09/21/18   1310   09/22/18   0455  09/23/18   0440  09/23/18   0514  09/23/18   1226  09/24/18   0240  09/24/18   0544   NA   --    < >   --   133*   --   134*  136   --    K   --    < >  4.9  6.0*  5.9  5.3*  5.5*  5.3   CL   --    < >   --   97*   --   98  98   --    CO2   --    < >   --   29   --   27  28   --    BUN   --    < >   --   84*   --   90*  96*   --    CREATININE   --    < >   --   1.3*   --   1.2  1.3*   --    CALCIUM   --    < >   --   7.8*   --   7.8*  7.7*   --    GLUCOSE   --    < >   --   189*   --   176*  116*   --    PHART   --    --   7.430   --   7.430   --    --   7.480*   [id number]   --    --   44.0   --   45.0   --    --   42.0   PO2ART   --    --   65.0*   --   86.0   --    --   74.0* [id number]   --    --   29.2*   --   29.9*   --    --   31.3*   Q3PNOMCG   --    --   93.9   --   95.8   --    --   93.7   BEART   --    --   4.3*   --   4.9*   --    --   7.1*   AST   --    --    --    --    --   256*   --    --    ALT   --    --    --    --    --   92*   --    --    ALKPHOS   --    --    --    --    --   279*   --    --    BILITOT   --    --    --    --    --   0.7   --    --    TROPONINI  0.05*   --    --    --    --    --    --    --    LACTA  1.6   --    --    --    --    --    --    --     < > = values in this interval not displayed. No results for input(s): BC, LABGRAM, CULTRESP, BFCX in the last 72 hours. Radiograph:  Examination. XR CHEST PORTABLE   History: Intubation. A frontal portable upright view of the chest is compared with the   previous study dated 9/23/2018. No interval change. An irregular area of consolidation in the right lower lung is   unchanged. The patchy areas of interstitial infiltrate in the left lung are   stable. There is no pleural effusion, pulmonary congestion or pneumothorax. The endotracheal tube, the nasogastric tube and left-sided PICC line   are in stable position.       Impression   No change. Signed by Dr Shivani Redding on 9/24/2018 7:26 AM     My radiograph interpretation: ETT in place, persistent interstitial infiltrates     Pulmonary Assessment:    1. Acute hypoxemic respiratory failure due to pulmonary edema. 2. Volume overload  3. Aspiration pneumonia  4. Hypernatremia  5. Poor oral intake due to dysphagia  6. Atrial fibrillation with RVR  7. Encephalopathy  8. Leukocytosis, much worse  9. MIRACLE  10. Hypokalemia    Recommend:     · Continue current vent management. Unable to wean as he is not better and he is unresponsive. · Per RN family considering hospice vs comfort care. Arrangements are still being made. · Continue bronchodilators and mucolytics for the time being  · Prognosis very poor at this point.  Agree with family's

## 2018-09-24 NOTE — PROGRESS NOTES
09/24/18 [facility]   General Comment   Comments No TX per RAFAL Kulkarni patient prognosis poor may be going  hospice   Physical Therapy    Electronically signed by Berny Barraza PTA on 9/24/2018 at 9:37 AM

## 2018-09-24 NOTE — PROGRESS NOTES
mg Q4H PRN   magnesium hydroxide (MILK OF MAGNESIA) 400 MG/5ML suspension 30 mL Daily PRN   glucose (GLUTOSE) 40 % oral gel 15 g PRN   dextrose 50 % solution 12.5 g PRN   glucagon (rDNA) injection 1 mg PRN   dextrose 5 % solution PRN     ROS unobtainable from patient    Vital Signs:  BP (!) 95/51   Pulse 99   Temp 97.6 °F (36.4 °C) (Temporal)   Resp 17   Ht 5' 6\" (1.676 m)   Wt 143 lb 4.8 oz (65 kg)   SpO2 98%   BMI 23.13 kg/m²     Physical Exam   Abdomen nondistended. I'll sounds present. Lungs a few coarse crackles  Heart without murmur  Line/IV site: No erythema, warmth, induration, or tenderness. Lab Results:  CBC:   Recent Labs      09/22/18   0430  09/23/18   0440  09/24/18   0240   WBC  31.5*  28.9*  23.6*   HGB  11.5*  10.9*  10.0*   PLT  150  198  253     BMP:  Recent Labs      09/23/18   0440   09/23/18   1226  09/24/18   0240  09/24/18   0544   NA  133*   --   134*  136   --    K  6.0*   < >  5.3*  5.5*  5.3   CL  97*   --   98  98   --    CO2  29   --   27  28   --    BUN  84*   --   90*  96*   --    CREATININE  1.3*   --   1.2  1.3*   --    GLUCOSE  189*   --   176*  116*   --     < > = values in this interval not displayed. Culture Results: Blood culture September 19, 2018 no growth    Radiology:   Chest x-ray today:  Narrative   Examination. XR CHEST PORTABLE   History: Intubation. A frontal portable upright view of the chest is compared with the   previous study dated 9/23/2018. No interval change. An irregular area of consolidation in the right lower lung is   unchanged. The patchy areas of interstitial infiltrate in the left lung are   stable. There is no pleural effusion, pulmonary congestion or pneumothorax. The endotracheal tube, the nasogastric tube and left-sided PICC line   are in stable position.       Impression   No change. Signed by Dr Joseline Rodriguez on 9/24/2018 7:26 AM     Additional Studies Reviewed:  None    Impression:  1. Respiratory failure  2.  Probable aspiration pneumonia  3. Leukocytosis-slight trend downward  4. Psychosis  5. Diabetes mellitus    Recommendations:  From infection standpoint he seems to be stable to improving in that he is without fever, FiO2 slightly decreased, he is on no pressors, and is hemodynamically stable at present with white blood cell count trending downward. Do not plan to change his antibiotic approach at present. From an overall prognosis standpoint, chances for long-term meaningful recovery seem to be small given his age and comorbidities. Continue current antibiotic treatment at present. Family elects more comfort care palliative/hospice approach, antibiotics could be discontinued.     Artur Bustos MD

## 2018-09-24 NOTE — PROGRESS NOTES
Dr Lupe Padilla office called in regard to need for Hospice consult. Telephone orders given.   Electronically signed by Carla Newman RN on 9/24/2018 at 11:32 AM

## 2018-09-24 NOTE — PROGRESS NOTES
Assuming care of pt this day. Pt lying in bed. Eyes closed at this time. Vital signs stable at this time. Cardiac rhythm: AF. Diprivan and Levophed gtt remain off for over 24 hours. Glucerna 1.2 tube feeding infusing at goal rate of 50 cc and 15 cc on TFF q1h. Pt continues to tolerate well. Palliative care Emmy Blood RN) called to notify of possible Hospice consult and Inpatient Hospice placement this day.   Electronically signed by Haleigh Hudson RN on 9/24/2018 at 7:51 AM

## 2018-09-24 NOTE — PROGRESS NOTES
[facility]) taking Danial Jerry and Benedict Ravi to tour Inpatient Hospice  Electronically signed by Carla Newman RN on 9/24/2018 at 11:38 AM

## 2018-09-24 NOTE — PROGRESS NOTES
protein, 132 g CHO and 1326ml free water from formula and  from water flush  · Additional Calories: No Propofol     · Anthropometric Measures:  · Ht: 5' 6\" (167.6 cm)   · Current Body Wt: 143 lb 4 oz (65 kg)  · Admission Body Wt: 135 lb (61.2 kg)  · % Weight Change: 6.6%,  4 days  · Ideal Body Wt: 142 lb (64.4 kg), % Ideal Body 88.8%  · BMI Classification: BMI 18.5 - 24.9 Normal Weight     · Comparative Standards (Estimated Nutrition Needs):  · Estimated Daily Total Kcal: 8750-1383 kcals  · Estimated Daily Protein (g): 58-70g  · Estimated Daily Fluid (ml/day): 7843-8016 ml    Estimated Intake vs Estimated Needs: Intake Meets Needs    Nutrition Risk Level: High    Nutrition Interventions:   Continue current Tube Feeding  Continued Inpatient Monitoring    Nutrition Evaluation:   · Evaluation: Progressing toward goals   · Goals: diet progression or nutrition support   · Monitoring: TF Intake, TF Tolerance, Gastric Residuals, Skin Integrity, Wound Healing, Fluid Balance, Weight, Pertinent Labs    See Adult Nutrition Doc Flowsheet for more detail.      Electronically signed by Danni Andrade, MS, RD, LD on 9/24/18 at 12:39 PM    Contact Number: [phone]

## 2018-09-24 NOTE — PROGRESS NOTES
09/24/18   0544   NA  133*   --   134*  136   --    K  6.0*   < >  5.3*  5.5*  5.3   CL  97*   --   98  98   --    CO2  29   --   27  28   --    BUN  84*   --   90*  96*   --    CREATININE  1.3*   --   1.2  1.3*   --    CALCIUM  7.8*   --   7.8*  7.7*   --     < > = values in this interval not displayed. CBC:   Recent Labs      09/22/18   0430  09/23/18   0440  09/24/18   0240   WBC  31.5*  28.9*  23.6*   HGB  11.5*  10.9*  10.0*   HCT  34.3*  32.6*  29.8*   MCV  98.8*  99.7*  99.3*   PLT  150  198  253     LIVER PROFILE:   Recent Labs      09/23/18   1226   AST  256*   ALT  92*   BILITOT  0.7   ALKPHOS  279*     PT/INR: No results for input(s): PROTIME, INR in the last 72 hours. APTT: No results for input(s): APTT in the last 72 hours. BNP:  No results for input(s): BNP in the last 72 hours. Ionized Calcium:No results for input(s): IONCA in the last 72 hours. Magnesium:  No results for input(s): MG in the last 72 hours. Phosphorus:  No results for input(s): PHOS in the last 72 hours. HgbA1C: No results for input(s): LABA1C in the last 72 hours. Hepatic:   Recent Labs      09/23/18   1226   ALKPHOS  279*   ALT  92*   AST  256*   PROT  5.0*   BILITOT  0.7   LABALBU  1.5*     Lactic Acid:   No results for input(s): LACTA in the last 72 hours. Troponin: No results for input(s): CKTOTAL, CKMB, TROPONINT in the last 72 hours. ABGs:   Lab Results   Component Value Date    PHART 7.480 09/24/2018    PO2ART 74.0 09/24/2018    GAJ3UJW 42.0 09/24/2018     CRP:  No results for input(s): CRP in the last 72 hours. Sed Rate:  No results for input(s): SEDRATE in the last 72 hours. Culture Results:   Blood Culture Recent:   Recent Labs      09/19/18   0636   BC  No growth after 5 days of incubation. Urine Culture Recent : No results for input(s): LABURIN in the last 720 hours.     Radiology reports as per the Radiologist  Radiology: Us Renal Complete    Result Date: 9/14/2018  US RENAL COMPLETE 9/14/2018 1:51 Hypoxia. Portable chest x-ray compared with 09/11/2018. Interstitial prominence and central vascular prominence compatible with early CHF and developing pulmonary edema. The upper lobes are clear. There is no pneumothorax. Heart size is normal.    1. Increased interstitial and vascular prominence compatible with CHF. Signed by Dr Klever Musa on 9/12/2018 1:43 PM    Xr Chest Portable    Result Date: 9/11/2018  XR CHEST PORTABLE 9/11/2018 5:00 PM HISTORY: Shortness of breath COMPARISON: 9/11/2018 at 12:26 PM. FINDINGS: Frontal view of the chest was obtained. Diffuse opacities are now seen in bilateral lungs, greater on the left. The cardiomediastinal silhouette and pulmonary vascularity are unchanged. The osseous structures and surrounding soft tissues demonstrate no acute abnormality. 1. Interval development of bilateral airspace opacities, much greater on the left. Findings could be due to pulmonary edema. Pneumonia is considered less likely given the abrupt interval appearance. Signed by Dr Catracho Zavala on 9/11/2018 6:27 PM    Xr Chest Portable    Result Date: 9/11/2018  XR CHEST PORTABLE 9/11/2018 1:08 PM History: Productive cough. Low O2 saturation. Portable chest x-ray with no comparison. The heart size is normal. The mediastinum is within normal limits. The lungs are normally expanded with no pneumonia or pneumothorax. No congestive failure changes. 1. No acute disease. Signed by Dr Klever Musa on 9/11/2018 1:08 PM       Assessment   MIRACLE / obstructive uropathy - improved   BPH with obstruction  Aspiration PNA  Hypernatremia  Hypokalemia  AFib    Plan:  Continue free water with Tube feeds  Follow up labs  Family interested in comfort measures.       Yobani Lorenzo MD  09/24/18  2:31 PM

## 2018-09-24 NOTE — PROGRESS NOTES
cardiomediastinal silhouette and pulmonary vascularity are unchanged. No acute osseous or soft tissue abnormality is noted. Impression: 1. No significant interval change since previous exam. Signed by Dr Dada Barclay on 9/20/2018 7:38 AM    Xr Chest Portable    Result Date: 9/19/2018  XR CHEST PORTABLE 9/19/2018 10:30 AM HISTORY: PIC line placement Comparison: 9/19/2018 at 7:49 AM Findings: The tip of the left upper extremity PICC extends to the atriocaval junction. Other lines and tubes are stable. Diffuse opacities in bilateral lungs have slightly increased. The cardiomediastinal silhouette and pulmonary vascularity are unchanged. No acute osseous or soft tissue abnormality is noted. Impression: 1. Satisfactory position of the PICC line. 2. Increasing, diffuse airspace opacities. Signed by Dr Dada Barclay on 9/19/2018 11:50 AM    Xr Chest Portable    Result Date: 9/19/2018  History: 27-year-old with OG placement. Reference: Chest radiograph earlier today. Findings: Frontal chest radiograph performed. Heart and mediastinum unchanged. Pulmonary opacities are unchanged. ET tube remains in good position. The weighted feeding tube is been replaced with an OG tube, tip at expected gastric antrum/duodenal bulb. Satisfactory OG tube. Signed by Dr Alexia Arreaga on 9/19/2018 7:57 AM    Xr Chest Portable    Result Date: 9/19/2018  History: 27-year-old with intubation. Reference: Chest radiograph one day prior. Findings: Frontal chest radiograph performed. Diffuse patchy opacities more severe in the perihilar regions. No significant change from yesterday. Heart and mediastinum unchanged. No pleural effusion or pneumothorax. Patient has been intubated, ET tube 5 cm above the arnold. Weighted feeding tube, tip in gastric fundus, unchanged. Satisfactory ET tube. Persistent airspace opacities, edema and/or pneumonia.  Signed by Dr Alexia Arreaga on 9/19/2018 7:37 AM    Xr Chest Portable    Result Date:

## 2018-09-25 NOTE — PROGRESS NOTES
Tube feeding stopped at this time per Dr Yann Gordon verbal orders  Electronically signed by Jus Ramos RN on 9/25/2018 at 12:28 PM

## 2018-09-25 NOTE — PROGRESS NOTES
Normal cortical thickness and normal cortical echogenicity. No hydronephrosis. Right kidney = 9.6 x 4.9 x 4.9 cm. Cortical thickness = 1.3-1.4 cm. Left kidney = 10.9 x 5.1 x 5.1 cm. Cortical thickness = 1.2-1.7 cm. Bladder catheter present. The urinary bladder is decompressed and not well visualized. No free pelvic fluid is seen. 1. No abnormality is seen. Signed by Dr Bo Novak on 9/14/2018 1:52 PM    Xr Chest Portable    Result Date: 9/16/2018  EXAM: XR CHEST PORTABLE -- 9/16/2018 10:30 AM HISTORY: 85 years, Male, hypoxemia COMPARISON: 9/15/2018 TECHNIQUE:  1 images. Frontal view of the chest. FINDINGS:  No pneumothorax. Similar right pulmonary opacity. Increased left pulmonary opacity. Similar nonenlarged cardiac and mediastinal silhouette. No acute bony findings. 1. Increased left pulmonary opacity and similar right pulmonary opacity. This could represent infection or edema and has been gradually increasing over the past few days. Signed by Dr Kamila Sifuentes on 9/16/2018 4:17 PM    Xr Chest Portable    Result Date: 9/15/2018  XR CHEST PORTABLE 9/15/2018 1:30 PM HISTORY:   Hypoxia  Single view. COMPARISONS:  9/12/2018, 9/11/2018 FINDINGS: Perihilar and lower lobe airspace opacities again appreciated with increasing lower lobe consolidation compared to the most recent examination. Differential considerations include infectious/inflammatory change, pneumonia as well as pulmonary edema from congestive heart failure. The heart is generous. The bony structures are intact. 1. Bilateral perihilar lower lobe interstitial and airspace opacities with increasing lower lobe consolidation compared to the 9/12/2018 examination. Signed by Dr Herman Sawyer on 9/15/2018 2:50 PM    Xr Chest Portable    Result Date: 9/12/2018  XR CHEST PORTABLE 9/12/2018 1:42 PM History: Short of breath. Hypoxia. Portable chest x-ray compared with 09/11/2018.  Interstitial prominence and central vascular prominence compatible

## 2018-09-25 NOTE — PROGRESS NOTES
Follow up visit:  Pt remains on vent. Family/friend at bedside. Waiting on pt dtr to arrive and make decision for withdrawal of care in [facility].. Nurse aware and will call when dtr arrives.   Electronically signed by Dasha Harrison RN on 9/25/2018 at 10:51 AM

## 2018-09-25 NOTE — PROGRESS NOTES
Evening assessment complete and charted at this time. No s/s of distress. No family present in room.  Will continue to monitor

## 2018-09-25 NOTE — PROGRESS NOTES
ETT in place, OG intact with TF  Eyes: Pupils are equal and reactive to light. Neck: No definite JVD noted. Chest: Coarse rales throughout, diminished  Cardiac: He has an irregular rhythm and is tachycardic. Abdomen: Soft. Extremities: He has trace edema of the calves. Musculoskeletal: No joint deformities noted. Dermatologic: No rash noted. Neurologic: He is unresponsive and intubated. No purposeful movements. Does not open his eyes to voice. Psychiatric: unresponsive. No sedation infusing   Lymphatic: No adenopathy palpated. Recent Labs      09/23/18   0440  09/24/18   0240  09/25/18   0605   WBC  28.9*  23.6*  20.8*   RBC  3.27*  3.00*  2.92*   HGB  10.9*  10.0*  9.6*   HCT  32.6*  29.8*  29.2*   PLT  198  253  353   MCV  99.7*  99.3*  100.0*   MCH  33.3*  33.3*  32.9*   MCHC  33.4  33.6  32.9*   RDW  14.6*  14.6*  14.6*      Recent Labs      09/23/18   0514  09/23/18   1226  09/24/18   0240  09/24/18   0544  09/25/18   0453  09/25/18   0605   NA   --   134*  136   --    --   139   K  5.9  5.3*  5.5*  5.3  5.7  5.7*   CL   --   98  98   --    --   101   CO2   --   27  28   --    --   33*   BUN   --   90*  96*   --    --   99*   CREATININE   --   1.2  1.3*   --    --   1.1   CALCIUM   --   7.8*  7.7*   --    --   8.0*   GLUCOSE   --   176*  116*   --    --   128*   PHART  7.430   --    --   7.480*  7.520*   --    LXQ0XRU  45.0   --    --   42.0  40.0   --    PO2ART  86.0   --    --   74.0*  85.0   --    GRX3VOF  29.9*   --    --   31.3*  32.7*   --    E0XGCSWL  95.8   --    --   93.7  95.5   --    BEART  4.9*   --    --   7.1*  9.1*   --    AST   --   256*   --    --    --    --    ALT   --   92*   --    --    --    --    ALKPHOS   --   279*   --    --    --    --    BILITOT   --   0.7   --    --    --    --       No results for input(s): BC, LABGRAM, CULTRESP, BFCX in the last 72 hours.   Radiograph: no official reading     My radiograph interpretation: ETT in place, persistent interstitial

## 2018-09-25 NOTE — PROGRESS NOTES
09/25/18 1056   General Comment   Comments No TX per [facility],  patient has poor prognosis     hospice considered   Physical Therapy

## 2021-09-27 NOTE — PLAN OF CARE
How Severe Are Your Spot(S)?: mild Problem: Nutrition  Goal: Optimal nutrition therapy  Nutrition Problem: Inadequate oral intake  Intervention: Food and/or Nutrient Delivery: Modify current diet  Nutritional Goals: po intake 50% or greater    Outcome: Ongoing Have Your Spot(S) Been Treated In The Past?: has not been treated Hpi Title: Evaluation of Skin Lesions